# Patient Record
Sex: MALE | Race: WHITE | Employment: FULL TIME | URBAN - METROPOLITAN AREA
[De-identification: names, ages, dates, MRNs, and addresses within clinical notes are randomized per-mention and may not be internally consistent; named-entity substitution may affect disease eponyms.]

---

## 2019-07-12 ENCOUNTER — OFFICE VISIT (OUTPATIENT)
Dept: FAMILY MEDICINE CLINIC | Age: 54
End: 2019-07-12
Payer: COMMERCIAL

## 2019-07-12 VITALS
RESPIRATION RATE: 12 BRPM | WEIGHT: 188 LBS | OXYGEN SATURATION: 97 % | DIASTOLIC BLOOD PRESSURE: 66 MMHG | BODY MASS INDEX: 30.22 KG/M2 | SYSTOLIC BLOOD PRESSURE: 138 MMHG | HEART RATE: 60 BPM | HEIGHT: 66 IN | TEMPERATURE: 97 F

## 2019-07-12 DIAGNOSIS — Z12.5 SCREENING FOR PROSTATE CANCER: ICD-10-CM

## 2019-07-12 DIAGNOSIS — Z79.4 DIABETES MELLITUS TYPE 2, INSULIN DEPENDENT (HCC): ICD-10-CM

## 2019-07-12 DIAGNOSIS — Z00.00 ROUTINE GENERAL MEDICAL EXAMINATION AT A HEALTH CARE FACILITY: Primary | ICD-10-CM

## 2019-07-12 DIAGNOSIS — E11.9 DIABETES MELLITUS TYPE 2, INSULIN DEPENDENT (HCC): ICD-10-CM

## 2019-07-12 PROCEDURE — 99386 PREV VISIT NEW AGE 40-64: CPT | Performed by: FAMILY MEDICINE

## 2019-07-12 RX ORDER — ASPIRIN 325 MG
325 TABLET ORAL
COMMUNITY
Start: 2012-07-30

## 2019-07-12 RX ORDER — METOPROLOL SUCCINATE 25 MG/1
25 TABLET, EXTENDED RELEASE ORAL
COMMUNITY
Start: 2019-03-04 | End: 2020-01-23 | Stop reason: SDUPTHER

## 2019-07-12 RX ORDER — NIACIN 750 MG/1
750 TABLET, EXTENDED RELEASE ORAL
COMMUNITY
Start: 2015-02-05 | End: 2019-07-12

## 2019-07-12 RX ORDER — IBUPROFEN 800 MG/1
200 TABLET ORAL PRN
COMMUNITY
Start: 2015-02-12 | End: 2021-11-16 | Stop reason: SDUPTHER

## 2019-07-12 RX ORDER — ATORVASTATIN CALCIUM 80 MG/1
80 TABLET, FILM COATED ORAL
COMMUNITY
Start: 2015-02-05 | End: 2019-11-20 | Stop reason: SDUPTHER

## 2019-07-12 RX ORDER — ALBUTEROL SULFATE 90 UG/1
2 AEROSOL, METERED RESPIRATORY (INHALATION)
COMMUNITY
Start: 2018-10-19 | End: 2019-07-12

## 2019-07-12 RX ORDER — GLIMEPIRIDE 4 MG/1
4 TABLET ORAL NIGHTLY
Qty: 90 TABLET | Refills: 1 | Status: SHIPPED | OUTPATIENT
Start: 2019-07-12 | End: 2019-11-26 | Stop reason: SDUPTHER

## 2019-07-12 RX ORDER — NITROGLYCERIN 0.4 MG/1
0.4 TABLET SUBLINGUAL
COMMUNITY
Start: 2018-06-14 | End: 2020-01-23 | Stop reason: SDUPTHER

## 2019-07-12 RX ORDER — GLIMEPIRIDE 4 MG/1
4 TABLET ORAL NIGHTLY
Qty: 30 TABLET | Refills: 0 | Status: SHIPPED | OUTPATIENT
Start: 2019-07-12 | End: 2019-07-12 | Stop reason: SDUPTHER

## 2019-07-12 RX ORDER — RAMIPRIL 10 MG/1
10 CAPSULE ORAL
COMMUNITY
Start: 2019-01-09 | End: 2019-07-12 | Stop reason: SDUPTHER

## 2019-07-12 RX ORDER — GLIMEPIRIDE 4 MG/1
4 TABLET ORAL
COMMUNITY
Start: 2015-02-05 | End: 2019-07-12 | Stop reason: SDUPTHER

## 2019-07-12 RX ORDER — RAMIPRIL 10 MG/1
10 CAPSULE ORAL DAILY
Qty: 30 CAPSULE | Refills: 0 | Status: SHIPPED | OUTPATIENT
Start: 2019-07-12 | End: 2019-07-12 | Stop reason: SDUPTHER

## 2019-07-12 RX ORDER — LANCETS
EACH MISCELLANEOUS
COMMUNITY
Start: 2019-01-09

## 2019-07-12 RX ORDER — FLASH GLUCOSE SCANNING READER
1 EACH MISCELLANEOUS 4 TIMES DAILY
Qty: 1 DEVICE | Refills: 0 | Status: SHIPPED | OUTPATIENT
Start: 2019-07-12 | End: 2020-05-18

## 2019-07-12 RX ORDER — ATENOLOL 50 MG/1
50 TABLET ORAL
COMMUNITY
Start: 2015-02-05 | End: 2019-07-12

## 2019-07-12 RX ORDER — CLOTRIMAZOLE AND BETAMETHASONE DIPROPIONATE 10; .5 MG/ML; MG/ML
LOTION TOPICAL
COMMUNITY
Start: 2013-06-06 | End: 2019-07-12 | Stop reason: ALTCHOICE

## 2019-07-12 RX ORDER — FLASH GLUCOSE SENSOR
1 KIT MISCELLANEOUS
Qty: 6 EACH | Refills: 2 | Status: SHIPPED | OUTPATIENT
Start: 2019-07-12 | End: 2020-05-18

## 2019-07-12 RX ORDER — RAMIPRIL 10 MG/1
10 CAPSULE ORAL DAILY
Qty: 90 CAPSULE | Refills: 1 | Status: SHIPPED | OUTPATIENT
Start: 2019-07-12 | End: 2019-11-26 | Stop reason: SDUPTHER

## 2019-07-12 ASSESSMENT — PATIENT HEALTH QUESTIONNAIRE - PHQ9
SUM OF ALL RESPONSES TO PHQ9 QUESTIONS 1 & 2: 0
SUM OF ALL RESPONSES TO PHQ QUESTIONS 1-9: 0
2. FEELING DOWN, DEPRESSED OR HOPELESS: 0
1. LITTLE INTEREST OR PLEASURE IN DOING THINGS: 0
SUM OF ALL RESPONSES TO PHQ QUESTIONS 1-9: 0

## 2019-07-12 NOTE — PATIENT INSTRUCTIONS
1) Keep up fitness! .    2) Medications are renewed and are at [address] and [facility]. Try out the [facility] if covered. 3) Keep up partnership with [facility] and Cardiologist. Let your eye doctor know who your PCP is. 4) Get fasting labwork done in July or August.  --You can get your lab work, x-ray, MRI, or ultrasound at our nearest [facility] location across the parking lot at   [employer]  Lab hours ([address] Monday through Friday; 8AM - noon on Saturdays),   Ph# [phone]  Radiology hours (7:00AM - 5PM Monday through Friday only)  --Call our office in 1 week if you have not heard about the results. Or check [employer] if you have previously enrolled.

## 2019-07-15 ENCOUNTER — TELEPHONE (OUTPATIENT)
Dept: FAMILY MEDICINE CLINIC | Age: 54
End: 2019-07-15

## 2019-07-15 NOTE — TELEPHONE ENCOUNTER
Spoke to patient he already had gone to his old physicians office and had filled out a record release from them on 7-12-19. I advised that he should contact them to see what the turnaround time is on sending out their records. He will check with their office later in the week to see if we have received the records as of yet.

## 2019-08-15 DIAGNOSIS — Z79.4 DIABETES MELLITUS TYPE 2, INSULIN DEPENDENT (HCC): Primary | ICD-10-CM

## 2019-08-15 DIAGNOSIS — E11.9 DIABETES MELLITUS TYPE 2, INSULIN DEPENDENT (HCC): Primary | ICD-10-CM

## 2019-08-15 NOTE — TELEPHONE ENCOUNTER
Mr Lan Saab called staling he needs a refill on the following medications:   metFORMIN (GLUCOPHAGE) 1000 MG tablet     sAXagliptin-metFORMIN ER (KOMBIGLYZE XR) 2.5-1000 MG TB24     Please send a 30 day supply to:  [address], [address] - F [phone]      And a 90 day supply of the same meds to:   [facility] [address] [facility]      Note: PLEASE SEND 90 DAY SUPPLY OF   insulin glargine (TOUJEO SOLOSTAR) 300 UNIT/ML injection pen to [address], [address] [facility] - F [phone]  because this medication is not covered by his insurance and patient will be using a coupon discount card.       PATIENT IS OUT OF BOTH PILLS PLEASE SEND TODAY :)    If any question pleae patient at [phone]

## 2019-11-20 RX ORDER — ATORVASTATIN CALCIUM 80 MG/1
80 TABLET, FILM COATED ORAL DAILY
Qty: 90 TABLET | Refills: 1 | Status: SHIPPED | OUTPATIENT
Start: 2019-11-20 | End: 2020-01-23 | Stop reason: SDUPTHER

## 2019-11-26 DIAGNOSIS — Z79.4 DIABETES MELLITUS TYPE 2, INSULIN DEPENDENT (HCC): ICD-10-CM

## 2019-11-26 DIAGNOSIS — E11.9 DIABETES MELLITUS TYPE 2, INSULIN DEPENDENT (HCC): ICD-10-CM

## 2019-11-27 RX ORDER — INSULIN LISPRO 100 [IU]/ML
INJECTION, SOLUTION INTRAVENOUS; SUBCUTANEOUS
Qty: 45 ML | Refills: 0 | Status: SHIPPED | OUTPATIENT
Start: 2019-11-27 | End: 2020-01-22

## 2019-11-27 RX ORDER — GLIMEPIRIDE 4 MG/1
4 TABLET ORAL NIGHTLY
Qty: 90 TABLET | Refills: 0 | Status: SHIPPED | OUTPATIENT
Start: 2019-11-27 | End: 2020-01-23 | Stop reason: SDUPTHER

## 2019-11-27 RX ORDER — RAMIPRIL 10 MG/1
10 CAPSULE ORAL DAILY
Qty: 90 CAPSULE | Refills: 0 | Status: SHIPPED | OUTPATIENT
Start: 2019-11-27 | End: 2020-01-23 | Stop reason: SDUPTHER

## 2019-12-04 DIAGNOSIS — Z79.4 DIABETES MELLITUS TYPE 2, INSULIN DEPENDENT (HCC): Primary | ICD-10-CM

## 2019-12-04 DIAGNOSIS — E11.9 DIABETES MELLITUS TYPE 2, INSULIN DEPENDENT (HCC): Primary | ICD-10-CM

## 2019-12-05 ENCOUNTER — TELEPHONE (OUTPATIENT)
Dept: ORTHOPEDIC SURGERY | Age: 54
End: 2019-12-05

## 2019-12-05 ENCOUNTER — TELEPHONE (OUTPATIENT)
Dept: FAMILY MEDICINE CLINIC | Age: 54
End: 2019-12-05

## 2019-12-05 DIAGNOSIS — Z79.4 DIABETES MELLITUS TYPE 2, INSULIN DEPENDENT (HCC): Primary | ICD-10-CM

## 2019-12-05 DIAGNOSIS — E11.9 DIABETES MELLITUS TYPE 2, INSULIN DEPENDENT (HCC): Primary | ICD-10-CM

## 2020-01-23 ENCOUNTER — OFFICE VISIT (OUTPATIENT)
Dept: FAMILY MEDICINE CLINIC | Age: 55
End: 2020-01-23
Payer: COMMERCIAL

## 2020-01-23 VITALS
WEIGHT: 190.4 LBS | OXYGEN SATURATION: 97 % | HEART RATE: 65 BPM | TEMPERATURE: 97.4 F | RESPIRATION RATE: 12 BRPM | BODY MASS INDEX: 30.73 KG/M2 | SYSTOLIC BLOOD PRESSURE: 180 MMHG | DIASTOLIC BLOOD PRESSURE: 82 MMHG

## 2020-01-23 PROCEDURE — G8417 CALC BMI ABV UP PARAM F/U: HCPCS | Performed by: FAMILY MEDICINE

## 2020-01-23 PROCEDURE — 99214 OFFICE O/P EST MOD 30 MIN: CPT | Performed by: FAMILY MEDICINE

## 2020-01-23 PROCEDURE — 2022F DILAT RTA XM EVC RTNOPTHY: CPT | Performed by: FAMILY MEDICINE

## 2020-01-23 PROCEDURE — G8484 FLU IMMUNIZE NO ADMIN: HCPCS | Performed by: FAMILY MEDICINE

## 2020-01-23 PROCEDURE — 3046F HEMOGLOBIN A1C LEVEL >9.0%: CPT | Performed by: FAMILY MEDICINE

## 2020-01-23 PROCEDURE — G8427 DOCREV CUR MEDS BY ELIG CLIN: HCPCS | Performed by: FAMILY MEDICINE

## 2020-01-23 PROCEDURE — 3017F COLORECTAL CA SCREEN DOC REV: CPT | Performed by: FAMILY MEDICINE

## 2020-01-23 PROCEDURE — 1036F TOBACCO NON-USER: CPT | Performed by: FAMILY MEDICINE

## 2020-01-23 RX ORDER — BLOOD-GLUCOSE METER
1 KIT MISCELLANEOUS
Qty: 1 KIT | Refills: 0 | Status: SHIPPED | OUTPATIENT
Start: 2020-01-23

## 2020-01-23 RX ORDER — METOPROLOL SUCCINATE 25 MG/1
25 TABLET, EXTENDED RELEASE ORAL DAILY
Qty: 90 TABLET | Refills: 3 | Status: SHIPPED | OUTPATIENT
Start: 2020-01-23 | End: 2020-08-18

## 2020-01-23 RX ORDER — INSULIN LISPRO 100 [IU]/ML
INJECTION, SOLUTION INTRAVENOUS; SUBCUTANEOUS
Qty: 45 ML | Refills: 1 | Status: SHIPPED | OUTPATIENT
Start: 2020-01-23 | End: 2020-01-23 | Stop reason: SDUPTHER

## 2020-01-23 RX ORDER — GLUCOSAMINE HCL/CHONDROITIN SU 500-400 MG
CAPSULE ORAL
Qty: 100 STRIP | Refills: 0 | Status: SHIPPED | OUTPATIENT
Start: 2020-01-23

## 2020-01-23 RX ORDER — RAMIPRIL 10 MG/1
10 CAPSULE ORAL DAILY
Qty: 90 CAPSULE | Refills: 3 | Status: SHIPPED | OUTPATIENT
Start: 2020-01-23 | End: 2021-02-18 | Stop reason: SDUPTHER

## 2020-01-23 RX ORDER — AZITHROMYCIN 250 MG/1
250 TABLET, FILM COATED ORAL SEE ADMIN INSTRUCTIONS
Qty: 6 TABLET | Refills: 0 | Status: SHIPPED | OUTPATIENT
Start: 2020-01-23 | End: 2020-01-28

## 2020-01-23 RX ORDER — INSULIN LISPRO 100 [IU]/ML
INJECTION, SOLUTION INTRAVENOUS; SUBCUTANEOUS
Qty: 45 ML | Refills: 1 | Status: SHIPPED | OUTPATIENT
Start: 2020-01-23 | End: 2020-08-17 | Stop reason: SDUPTHER

## 2020-01-23 RX ORDER — ATORVASTATIN CALCIUM 80 MG/1
80 TABLET, FILM COATED ORAL DAILY
Qty: 90 TABLET | Refills: 1 | Status: SHIPPED | OUTPATIENT
Start: 2020-01-23 | End: 2020-07-22 | Stop reason: SDUPTHER

## 2020-01-23 RX ORDER — NITROGLYCERIN 0.4 MG/1
0.4 TABLET SUBLINGUAL EVERY 5 MIN PRN
Qty: 25 TABLET | Refills: 0 | Status: SHIPPED | OUTPATIENT
Start: 2020-01-23 | End: 2020-08-17 | Stop reason: SDUPTHER

## 2020-01-23 RX ORDER — GLIMEPIRIDE 4 MG/1
4 TABLET ORAL NIGHTLY
Qty: 90 TABLET | Refills: 3 | Status: SHIPPED | OUTPATIENT
Start: 2020-01-23 | End: 2021-02-18 | Stop reason: SDUPTHER

## 2020-01-23 NOTE — PATIENT INSTRUCTIONS
1) Keep up f/u plans with [facility]. Ask them what they think about the letter. 2) Sorry, no flu shots here. Check local retail pharmacies about flu shot. 3) Let your PCP know if the gastric emptying test is too expensive. 4) Start abx and notify your PCP if no significant improvement in 5 to 7 days. 5) Recheck labwork in 3 months. Recommendations for Adults   Get at least 150 minutes per week of moderate-intensity aerobic activity or 75 minutes per week of vigorous aerobic activity, or a combination of both, preferably spread throughout the week.  Add moderate- to high-intensity muscle-strengthening activity (such as resistance or weights) on at least 2 days per week.  Spend less time sitting. Even light-intensity activity can offset some of the risks of being sedentary.  Gain even more benefits by being active at least 300 minutes (5 hours) per week.  Increase amount and intensity gradually over time. Recommendations for Kids   Children 15 years old should be physically active and have plenty of opportunities to move throughout the day.  Kids 6-12 years old should get at least 60 minutes per day of moderate- to vigorous-intensity physical activity, mostly aerobic.  Include vigorous-intensity activity on at least 3 days per week.  Include muscle- and bone-strengthening (weight-bearing) activities on at least 3 days per week.  Increase amount and intensity gradually over time. What is intensity? Physical activity is anything that moves your body and burns calories. This includes things like walking, climbing stairs and stretching. Aerobic (or cardio) activity gets your heart rate up and benefits your heart by improving cardiorespiratory fitness. When done at moderate intensity, your heart will beat faster and youll breathe harder than normal, but youll still be able to talk. Think of it as a medium or moderate amount of effort.   Examples of moderate-intensity aerobic activities:   brisk walking (at least 2.5 miles per hour)    water aerobics    dancing (ballroom or social)    gardening    tennis (doubles)    biking slower than 10 miles per hour  Vigorous intensity activities will push your body a little further. They will require a higher amount of effort. Jas[place] probably get warm and begin to sweat. You wont be able to talk much without getting out of breath.   Examples of vigorous-intensity aerobic activities:   hiking uphill or with a heavy backpack    running    swimming laps    aerobic dancing    heavy yardwork like continuous digging or hoeing    tennis (singles)    cycling 10 miles per hour or faster    jumping rope

## 2020-01-23 NOTE — PROGRESS NOTES
Santi Dias MD   metFORMIN (GLUCOPHAGE) 1000 MG tablet Take 1 tablet by mouth Daily with supper  Patient not taking: Reported on 1/23/2020  Isaac Castaneda DO          Vitals:    01/23/20 0806 01/23/20 0838   BP: (!) 181/91 (!) 180/82   Pulse: 65    Resp: 12    Temp: 97.4 °F (36.3 °C)    TempSrc: Oral    SpO2: 97%    Weight: 190 lb 6.4 oz (86.4 kg)       Wt Readings from Last 3 Encounters:   01/23/20 190 lb 6.4 oz (86.4 kg)   07/12/19 188 lb (85.3 kg)     BP Readings from Last 3 Encounters:   01/23/20 (!) 180/82   07/12/19 138/66       There is no problem list on file for this patient. Immunization History   Administered Date(s) Administered    Influenza Vaccine, unspecified formulation 01/31/2013    Influenza Virus Vaccine 12/15/2008, 10/24/2011, 10/24/2013    Influenza, MDCK Quadv, with preserv IM (Flucelvax 4 yrs and older) 01/23/2019    Influenza, Jhony Hawks, IM, PF (6 mo and older Fluzone, Flulaval, Fluarix, and 3 yrs and older Afluria) 11/19/2015, 11/16/2016, 11/20/2017       No past medical history on file.   Past Surgical History:   Procedure Laterality Date    CORONARY ARTERY BYPASS GRAFT REDO  2016     Family History   Problem Relation Age of Onset    Heart Disease Mother     Heart Disease Father     Diabetes Father      Social History     Socioeconomic History    Marital status:      Spouse name: Not on file    Number of children: Not on file    Years of education: Not on file    Highest education level: Not on file   Occupational History    Not on file   Social Needs    Financial resource strain: Not on file    Food insecurity:     Worry: Not on file     Inability: Not on file    Transportation needs:     Medical: Not on file     Non-medical: Not on file   Tobacco Use    Smoking status: Never Smoker    Smokeless tobacco: Never Used   Substance and Sexual Activity    Alcohol use: Not Currently    Drug use: Never    Sexual activity: Not on file   Lifestyle    Physical activity:     Days per week: Not on file     Minutes per session: Not on file    Stress: Not on file   Relationships    Social connections:     Talks on phone: Not on file     Gets together: Not on file     Attends Advent service: Not on file     Active member of club or organization: Not on file     Attends meetings of clubs or organizations: Not on file     Relationship status: Not on file    Intimate partner violence:     Fear of current or ex partner: Not on file     Emotionally abused: Not on file     Physically abused: Not on file     Forced sexual activity: Not on file   Other Topics Concern    Not on file   Social History Narrative    Not on file       O: BP (!) 180/82   Pulse 65   Temp 97.4 °F (36.3 °C) (Oral)   Resp 12   Wt 190 lb 6.4 oz (86.4 kg)   SpO2 97%   BMI 30.73 kg/m²   Physical Exam  GEN: No acute distress, cooperative, well nourished, alert. HEENT: PEERLA, EOMI , normocephalic/atraumatic, nares and oropharynx clear. Mucus membranes normal, Tympanic membranes clear bilaterally. Neck: soft, supple, no thyromegaly, mass, no Lymphadenopathy  CV: Regular rate and rhythm, no murmur, rubs, gallops. No edema. Resp: Clear to auscultation bilaterally good air entry bilaterally  No crackles, wheeze. Breathing comfortably. Psych: normal affect. Neuro: AOx3  Abdomen exam tenderness to palpation in all quadrants but negative Cooper sign    Lab Results   Component Value Date    LABA1C 9.2 (H) 01/15/2020     Lab Results   Component Value Date     01/15/2020         ASSESSMENT   Diagnosis Orders   1. Diabetes mellitus type 2, insulin dependent (HCC)  insulin lispro, 1 Unit Dial, (HUMALOG KWIKPEN) 100 UNIT/ML SOPN    sAXagliptin-metFORMIN ER (KOMBIGLYZE XR) 2.5-1000 MG TB24    NM GASTRIC EMPTYING    HEMOGLOBIN A1C    COMPREHENSIVE METABOLIC PANEL   2. Bronchitis  azithromycin (ZITHROMAX) 250 MG tablet   3. Constipation, unspecified constipation type  NM GASTRIC EMPTYING   4. Abdominal bloating  NM GASTRIC EMPTYING     Needs better A1c control #1. Change Toujeo to [place] due to formulary change. Symptoms for #3 and 4 sound highly suggestive of gastroparesis. See plan below. #2: The risks, benefits, potential side effects and barriers to medication use were addressed today. Understanding was acknowledged. Patient asked to follow-up if condition(s) do not improve as anticipated. PLAN          If applicable, see additional patient information and instructions under \"Patient Instructions. \"    Return in about 3 months (around 4/23/2020). Patient Instructions   1) Keep up f/u plans with [facility]. Ask them what they think about the letter. 2) Sorry, no flu shots here. Check local retail pharmacies about flu shot. 3) Let your PCP know if the gastric emptying test is too expensive. 4) Start abx and notify your PCP if no significant improvement in 5 to 7 days. 5) Recheck labwork in 3 months. Recommendations for Adults   Get at least 150 minutes per week of moderate-intensity aerobic activity or 75 minutes per week of vigorous aerobic activity, or a combination of both, preferably spread throughout the week.  Add moderate- to high-intensity muscle-strengthening activity (such as resistance or weights) on at least 2 days per week.  Spend less time sitting. Even light-intensity activity can offset some of the risks of being sedentary.  Gain even more benefits by being active at least 300 minutes (5 hours) per week.  Increase amount and intensity gradually over time. Recommendations for Kids   Children 15 years old should be physically active and have plenty of opportunities to move throughout the day.  Kids 6-12 years old should get at least 60 minutes per day of moderate- to vigorous-intensity physical activity, mostly aerobic.  Include vigorous-intensity activity on at least 3 days per week.      Include muscle- and bone-strengthening (weight-bearing) activities on at least 3 days per week.  Increase amount and intensity gradually over time. What is intensity? Physical activity is anything that moves your body and burns calories. This includes things like walking, climbing stairs and stretching. Aerobic (or cardio) activity gets your heart rate up and benefits your heart by improving cardiorespiratory fitness. When done at moderate intensity, your heart will beat faster and youll breathe harder than normal, but youll still be able to talk. Think of it as a medium or moderate amount of effort. Examples of moderate-intensity aerobic activities:   brisk walking (at least 2.5 miles per hour)    water aerobics    dancing (ballroom or social)    gardening    tennis (doubles)    biking slower than 10 miles per hour  Vigorous intensity activities will push your body a little further. They will require a higher amount of effort. Fab Aloe probably get warm and begin to sweat. You wont be able to talk much without getting out of breath. Examples of vigorous-intensity aerobic activities:   hiking uphill or with a heavy backpack    running    swimming laps    aerobic dancing    heavy yardwork like continuous digging or hoeing    tennis (singles)    cycling 10 miles per hour or faster    jumping rope          Please note a portion of this chart was generated using dragon dictation software. Although every effort was made to ensure the accuracy of this automated transcription, some errors in transcription may have occurred.

## 2020-01-30 ENCOUNTER — TELEPHONE (OUTPATIENT)
Dept: FAMILY MEDICINE CLINIC | Age: 55
End: 2020-01-30

## 2020-01-30 DIAGNOSIS — J40 BRONCHITIS: Primary | ICD-10-CM

## 2020-01-30 RX ORDER — AMOXICILLIN 875 MG/1
875 TABLET, COATED ORAL 2 TIMES DAILY
Qty: 14 TABLET | Refills: 0 | Status: SHIPPED | OUTPATIENT
Start: 2020-01-30 | End: 2020-02-06

## 2020-01-30 RX ORDER — CODEINE PHOSPHATE/GUAIFENESIN 10-100MG/5
5 LIQUID (ML) ORAL 2 TIMES DAILY PRN
Qty: 50 ML | Refills: 0 | Status: SHIPPED | OUTPATIENT
Start: 2020-01-30 | End: 2020-02-04

## 2020-03-04 ENCOUNTER — TELEPHONE (OUTPATIENT)
Dept: ORTHOPEDIC SURGERY | Age: 55
End: 2020-03-04

## 2020-03-06 ENCOUNTER — TELEPHONE (OUTPATIENT)
Dept: FAMILY MEDICINE CLINIC | Age: 55
End: 2020-03-06

## 2020-03-06 DIAGNOSIS — Z79.4 DIABETES MELLITUS TYPE 2, INSULIN DEPENDENT (HCC): Primary | ICD-10-CM

## 2020-03-06 DIAGNOSIS — E11.9 DIABETES MELLITUS TYPE 2, INSULIN DEPENDENT (HCC): Primary | ICD-10-CM

## 2020-05-13 RX ORDER — INSULIN DEGLUDEC 200 U/ML
80 INJECTION, SOLUTION SUBCUTANEOUS EVERY EVENING
Qty: 3 PEN | Refills: 3 | Status: SHIPPED | OUTPATIENT
Start: 2020-05-13 | End: 2020-05-18 | Stop reason: SDUPTHER

## 2020-05-13 RX ORDER — INSULIN DEGLUDEC 200 U/ML
80 INJECTION, SOLUTION SUBCUTANEOUS NIGHTLY
Qty: 1 PEN | Refills: 0 | Status: SHIPPED
Start: 2020-05-13 | End: 2020-08-19

## 2020-05-13 RX ORDER — INSULIN DEGLUDEC 200 U/ML
80 INJECTION, SOLUTION SUBCUTANEOUS NIGHTLY
Qty: 1 PEN | Refills: 0 | Status: SHIPPED | OUTPATIENT
Start: 2020-05-13 | End: 2020-05-13 | Stop reason: SDUPTHER

## 2020-05-13 NOTE — TELEPHONE ENCOUNTER
Done.    Could office staff confirm 1 pen of Tresiba went to [address] and 3 month supply when to [facility]? Then let patient know.

## 2020-05-15 ENCOUNTER — TELEPHONE (OUTPATIENT)
Dept: FAMILY MEDICINE CLINIC | Age: 55
End: 2020-05-15

## 2020-05-15 NOTE — TELEPHONE ENCOUNTER
[address] [facility] called needing to clarify directions for the quantity. Patient said she suppose to receive a box. Pharmacy states that it says one pen. Please advise.  Thanks    Insulin Degludec (TRESIBA FLEXTOUCH) 200 UNIT/ML SOPN [[id number]]     Order Details   Dose: 80 Units Route: Subcutaneous Frequency: EVERY EVENING  Dispense Quantity: 3 pen Refills: 3 Fills remaining: --         Sig: Inject 80 Units into the skin every evening        Written Date: 05/13/20 Expiration Date: 05/13/21    Start Date: 05/13/20 End Date: --    Ordering Provider:  -- Authorizing Provider: Fercho Mcfarland DO Ordering User:  Fercho Mcfarland DO          Original Order:  Insulin Degludec ([address]) 200 UNIT/ML Willem Pierre [[id number]]    Pharmacy:  [facility] [facility] [phone] Kaylin Cohn [phone]     Pharmacy Comments: --

## 2020-05-18 ENCOUNTER — TELEPHONE (OUTPATIENT)
Dept: FAMILY MEDICINE CLINIC | Age: 55
End: 2020-05-18

## 2020-05-18 ENCOUNTER — VIRTUAL VISIT (OUTPATIENT)
Dept: FAMILY MEDICINE CLINIC | Age: 55
End: 2020-05-18
Payer: COMMERCIAL

## 2020-05-18 VITALS — BODY MASS INDEX: 29.05 KG/M2 | WEIGHT: 180 LBS

## 2020-05-18 PROCEDURE — 3017F COLORECTAL CA SCREEN DOC REV: CPT | Performed by: FAMILY MEDICINE

## 2020-05-18 PROCEDURE — G8417 CALC BMI ABV UP PARAM F/U: HCPCS | Performed by: FAMILY MEDICINE

## 2020-05-18 PROCEDURE — 3046F HEMOGLOBIN A1C LEVEL >9.0%: CPT | Performed by: FAMILY MEDICINE

## 2020-05-18 PROCEDURE — 99214 OFFICE O/P EST MOD 30 MIN: CPT | Performed by: FAMILY MEDICINE

## 2020-05-18 PROCEDURE — G8427 DOCREV CUR MEDS BY ELIG CLIN: HCPCS | Performed by: FAMILY MEDICINE

## 2020-05-18 PROCEDURE — 1036F TOBACCO NON-USER: CPT | Performed by: FAMILY MEDICINE

## 2020-05-18 PROCEDURE — 2022F DILAT RTA XM EVC RTNOPTHY: CPT | Performed by: FAMILY MEDICINE

## 2020-05-18 RX ORDER — INSULIN DEGLUDEC 200 U/ML
80 INJECTION, SOLUTION SUBCUTANEOUS EVERY EVENING
Qty: 2 PEN | Refills: 0 | Status: SHIPPED
Start: 2020-05-18 | End: 2020-08-19

## 2020-05-18 ASSESSMENT — PATIENT HEALTH QUESTIONNAIRE - PHQ9
SUM OF ALL RESPONSES TO PHQ QUESTIONS 1-9: 0
2. FEELING DOWN, DEPRESSED OR HOPELESS: 0
SUM OF ALL RESPONSES TO PHQ QUESTIONS 1-9: 0
1. LITTLE INTEREST OR PLEASURE IN DOING THINGS: 0
SUM OF ALL RESPONSES TO PHQ9 QUESTIONS 1 & 2: 0

## 2020-05-18 NOTE — TELEPHONE ENCOUNTER
See my tel enc 5/18 that Laie will dispense the remaining 2 pens. Then let Wilfrid Marcelino know. Verify that he still wants future Tresiba prescriptions though the [facility] mail order.

## 2020-05-18 NOTE — PROGRESS NOTES
from Last 3 Encounters:   01/23/20 (!) 180/82   07/12/19 138/66       There is no problem list on file for this patient. Immunization History   Administered Date(s) Administered    Influenza Vaccine, unspecified formulation 01/31/2013    Influenza Virus Vaccine 12/15/2008, 10/24/2011, 10/24/2013, 11/19/2015, 11/16/2016, 11/20/2017    Influenza, MDCK Quadv, with preserv IM (Flucelvax 4 yrs and older) 01/23/2019    Influenza, Venita Flies, IM, PF (6 mo and older Fluzone, Flulaval, Fluarix, and 3 yrs and older Afluria) 11/19/2015, 11/16/2016, 11/20/2017       No past medical history on file.   Past Surgical History:   Procedure Laterality Date    CORONARY ARTERY BYPASS GRAFT REDO  2016     Family History   Problem Relation Age of Onset    Heart Disease Mother     Heart Disease Father     Diabetes Father      Social History     Socioeconomic History    Marital status:      Spouse name: Not on file    Number of children: Not on file    Years of education: Not on file    Highest education level: Not on file   Occupational History    Not on file   Social Needs    Financial resource strain: Not on file    Food insecurity     Worry: Not on file     Inability: Not on file    Transportation needs     Medical: Not on file     Non-medical: Not on file   Tobacco Use    Smoking status: Never Smoker    Smokeless tobacco: Never Used   Substance and Sexual Activity    Alcohol use: Not Currently    Drug use: Never    Sexual activity: Not on file   Lifestyle    Physical activity     Days per week: Not on file     Minutes per session: Not on file    Stress: Not on file   Relationships    Social connections     Talks on phone: Not on file     Gets together: Not on file     Attends Religion service: Not on file     Active member of club or organization: Not on file     Attends meetings of clubs or organizations: Not on file     Relationship status: Not on file    Intimate partner violence     Fear of current type 2, insulin dependent (HCC)  Insulin Degludec (TRESIBA FLEXTOUCH) 200 UNIT/ML SOPN    HEMOGLOBIN A1C    COMPREHENSIVE METABOLIC PANEL   2. Need for hepatitis C screening test  HEPATITIS C ANTIBODY     Office will see what glucometer is in formulary. labwork this summer. F/u Aug 12. He will look into cologuard option. PLAN          If applicable, see additional patient information and instructions under \"Patient Instructions. \"    Return in about 3 months (around 2020). Patient Instructions   Keep up with a yearly eye exam and see the [facility] clinic in . Contact Cologuard. If free or inexepensive, call your PCP's office and he will order the test. If it is not covered. Notify Tonya and we can issue the yearly stool card test or refer you for colonoscopy. TOTAL TIME (in minutes) SPENT ON CONFERENCIN.    Please note a portion of this chart was generated using dragon dictation software. Although every effort was made to ensure the accuracy of this automated transcription, some errors in transcription may have occurred. Pursuant to the emergency declaration under the [facility], [facility]5 waiver authority and the [employer] Act, this Virtual  Visit was conducted, with patient's consent, to reduce the patient's risk of exposure to COVID-19 and provide continuity of care for an established patient. Services were provided through a video synchronous discussion virtually to substitute for in-person clinic visit. Patient was instructed that the AVS is available on My Chart or was emailed to the patient if not on My Chart. Lab orders were emailed to patient if they do not use a [facility] lab. Any work notes were sent to patient through My Chart or email.

## 2020-05-21 RX ORDER — BLOOD-GLUCOSE METER
1 KIT MISCELLANEOUS
Qty: 1 KIT | Refills: 0 | Status: SHIPPED
Start: 2020-05-21 | End: 2020-05-26

## 2020-05-21 RX ORDER — L. ACIDOPHILUS/BIFIDO. LONGUM 15 MG
1 CAPSULE,DELAYED RELEASE (ENTERIC COATED) ORAL
Qty: 100 EACH | Refills: 3 | Status: SHIPPED
Start: 2020-05-21 | End: 2020-05-26

## 2020-05-22 ENCOUNTER — TELEPHONE (OUTPATIENT)
Dept: FAMILY MEDICINE CLINIC | Age: 55
End: 2020-05-22

## 2020-05-26 RX ORDER — BLOOD-GLUCOSE METER
1 KIT MISCELLANEOUS
Qty: 1 KIT | Refills: 0 | Status: SHIPPED | OUTPATIENT
Start: 2020-05-26 | End: 2021-02-18 | Stop reason: ALTCHOICE

## 2020-05-26 RX ORDER — LANCETS
1 EACH MISCELLANEOUS
Qty: 100 EACH | Refills: 3 | Status: SHIPPED | OUTPATIENT
Start: 2020-05-26 | End: 2020-08-18 | Stop reason: SDUPTHER

## 2020-05-26 NOTE — TELEPHONE ENCOUNTER
Received DENIAL for IEV Glucose Test strips. Denial letter attached. Please advise patient. Thank you.

## 2020-05-26 NOTE — TELEPHONE ENCOUNTER
Is on insulin. Needs a meter on insurance plan. Call pharmacy that the [facility] mini meter along with test strips and lancets is on his formulary. THen update Rahel

## 2020-06-19 ENCOUNTER — TELEPHONE (OUTPATIENT)
Dept: FAMILY MEDICINE CLINIC | Age: 55
End: 2020-06-19

## 2020-06-29 ENCOUNTER — TELEPHONE (OUTPATIENT)
Dept: FAMILY MEDICINE CLINIC | Age: 55
End: 2020-06-29

## 2020-06-29 RX ORDER — AMLODIPINE BESYLATE 10 MG/1
10 TABLET ORAL DAILY
Qty: 30 TABLET | Refills: 0 | Status: SHIPPED | OUTPATIENT
Start: 2020-06-29 | End: 2020-07-22 | Stop reason: SDUPTHER

## 2020-06-29 NOTE — TELEPHONE ENCOUNTER
[facility] received a call from:    Name of Caller: Neymar    Relationship to patient:self    Organization name: n/a    Best contact number: [phone]    Reason for call: Patient called in and is requesting a rx sent over to pharmacy for high bp, patient said number was at   [phone]. Please advise. Pharmacy verified. Dressing: steri-strips

## 2020-07-20 ENCOUNTER — TELEPHONE (OUTPATIENT)
Dept: FAMILY MEDICINE CLINIC | Age: 55
End: 2020-07-20

## 2020-07-21 NOTE — TELEPHONE ENCOUNTER
Continue amlodipine 10 mg. Increase Metoprolol XL 25 mg from one tablet a day to two tablets a day. Call with BP update in 48 hours later.

## 2020-07-22 RX ORDER — AMLODIPINE BESYLATE 10 MG/1
10 TABLET ORAL DAILY
Qty: 90 TABLET | Refills: 1 | Status: SHIPPED | OUTPATIENT
Start: 2020-07-22 | End: 2020-12-01

## 2020-07-22 RX ORDER — ATORVASTATIN CALCIUM 80 MG/1
80 TABLET, FILM COATED ORAL DAILY
Qty: 90 TABLET | Refills: 1 | Status: SHIPPED | OUTPATIENT
Start: 2020-07-22 | End: 2020-12-01

## 2020-08-12 ENCOUNTER — TELEPHONE (OUTPATIENT)
Dept: FAMILY MEDICINE CLINIC | Age: 55
End: 2020-08-12

## 2020-08-13 NOTE — TELEPHONE ENCOUNTER
Try again please. Apologize for the last minute cancellation (no one rescheduled this; I had a doctor's meeting planned 7am-8 am).

## 2020-08-17 ENCOUNTER — VIRTUAL VISIT (OUTPATIENT)
Dept: FAMILY MEDICINE CLINIC | Age: 55
End: 2020-08-17
Payer: COMMERCIAL

## 2020-08-17 PROCEDURE — 3052F HG A1C>EQUAL 8.0%<EQUAL 9.0%: CPT | Performed by: FAMILY MEDICINE

## 2020-08-17 PROCEDURE — 99213 OFFICE O/P EST LOW 20 MIN: CPT | Performed by: FAMILY MEDICINE

## 2020-08-17 PROCEDURE — 3017F COLORECTAL CA SCREEN DOC REV: CPT | Performed by: FAMILY MEDICINE

## 2020-08-17 PROCEDURE — 2022F DILAT RTA XM EVC RTNOPTHY: CPT | Performed by: FAMILY MEDICINE

## 2020-08-17 PROCEDURE — G8427 DOCREV CUR MEDS BY ELIG CLIN: HCPCS | Performed by: FAMILY MEDICINE

## 2020-08-18 RX ORDER — INSULIN LISPRO 100 [IU]/ML
INJECTION, SOLUTION INTRAVENOUS; SUBCUTANEOUS
Qty: 45 ML | Refills: 3 | Status: SHIPPED | OUTPATIENT
Start: 2020-08-18 | End: 2021-02-18 | Stop reason: SDUPTHER

## 2020-08-18 RX ORDER — METOPROLOL TARTRATE 50 MG/1
50 TABLET, FILM COATED ORAL 2 TIMES DAILY
Qty: 60 TABLET | Refills: 5 | Status: SHIPPED | OUTPATIENT
Start: 2020-08-18 | End: 2020-11-13 | Stop reason: SDUPTHER

## 2020-08-18 RX ORDER — NITROGLYCERIN 0.4 MG/1
0.4 TABLET SUBLINGUAL EVERY 5 MIN PRN
Qty: 25 TABLET | Refills: 0 | Status: SHIPPED | OUTPATIENT
Start: 2020-08-18 | End: 2021-02-18 | Stop reason: SDUPTHER

## 2020-08-18 RX ORDER — PEN NEEDLE, DIABETIC 31 GX5/16"
NEEDLE, DISPOSABLE MISCELLANEOUS
Qty: 100 EACH | Refills: 3 | Status: SHIPPED | OUTPATIENT
Start: 2020-08-18 | End: 2020-08-21 | Stop reason: SDUPTHER

## 2020-08-18 RX ORDER — GABAPENTIN 300 MG/1
300 CAPSULE ORAL NIGHTLY
Qty: 30 CAPSULE | Refills: 5 | Status: SHIPPED | OUTPATIENT
Start: 2020-08-18 | End: 2021-02-18 | Stop reason: SDUPTHER

## 2020-08-18 RX ORDER — LANCETS
1 EACH MISCELLANEOUS
Qty: 100 EACH | Refills: 3 | Status: SHIPPED | OUTPATIENT
Start: 2020-08-18 | End: 2021-02-18 | Stop reason: ALTCHOICE

## 2020-08-18 NOTE — PROGRESS NOTES
DO Tonya   sAXagliptin-metFORMIN ER (KOMBIGLYZE XR) 2.5-1000 MG TB24 TAKE 1 TABLET BY MOUTH  DAILY  Patient not taking: Reported on 8/17/2020  Sheron Seed DO Tonya   blood glucose test strips (EXACTECH R-S-G TEST) strip Test 4 times daily as directed  Historical Provider, MD   ACCU-CHEK FASTCLIX LANCETS MISC Testing 4 x a day Dx: S80.09  Historical Provider, MD          Patient-Reported Vitals 8/17/2020   Patient-Reported Weight 180   Patient-Reported Height 5'7\"      There were no vitals filed for this visit. Wt Readings from Last 3 Encounters:   05/18/20 180 lb (81.6 kg)   01/23/20 190 lb 6.4 oz (86.4 kg)   07/12/19 188 lb (85.3 kg)     BP Readings from Last 3 Encounters:   01/23/20 (!) 180/82   07/12/19 138/66       There is no problem list on file for this patient. Immunization History   Administered Date(s) Administered    Influenza Vaccine, unspecified formulation 01/31/2013    Influenza Virus Vaccine 12/15/2008, 10/24/2011, 10/24/2013, 11/19/2015, 11/16/2016, 11/20/2017    Influenza, MDCK Quadv, with preserv IM (Flucelvax 4 yrs and older) 01/23/2019    Influenza, Ministerio , IM, PF (6 mo and older Fluzone, Flulaval, Fluarix, and 3 yrs and older Afluria) 11/19/2015, 11/16/2016, 11/20/2017       No past medical history on file.   Past Surgical History:   Procedure Laterality Date    CORONARY ARTERY BYPASS GRAFT REDO  2016     Family History   Problem Relation Age of Onset    Heart Disease Mother     Heart Disease Father     Diabetes Father      Social History     Socioeconomic History    Marital status:      Spouse name: Not on file    Number of children: Not on file    Years of education: Not on file    Highest education level: Not on file   Occupational History    Not on file   Social Needs    Financial resource strain: Not on file    Food insecurity     Worry: Not on file     Inability: Not on file    Transportation needs     Medical: Not on file     Non-medical: Not on file

## 2020-08-19 ENCOUNTER — TELEPHONE (OUTPATIENT)
Dept: FAMILY MEDICINE CLINIC | Age: 55
End: 2020-08-19

## 2020-08-19 DIAGNOSIS — Z79.4 DIABETES MELLITUS TYPE 2, INSULIN DEPENDENT (HCC): Primary | ICD-10-CM

## 2020-08-19 DIAGNOSIS — E11.9 DIABETES MELLITUS TYPE 2, INSULIN DEPENDENT (HCC): Primary | ICD-10-CM

## 2020-08-19 RX ORDER — INSULIN GLARGINE 100 [IU]/ML
80 INJECTION, SOLUTION SUBCUTANEOUS NIGHTLY
Qty: 5 PEN | Refills: 3 | Status: SHIPPED | OUTPATIENT
Start: 2020-08-19 | End: 2020-11-18

## 2020-08-19 NOTE — TELEPHONE ENCOUNTER
Lantus solostar pen is e-scribed to [facility]. It is typically dispensed in box quantity (5 pens in a box)  The unit converstion from [facility] to Lantus should be same. If using 80 units of Tresiba, it should be 80 units of Lantus.

## 2020-08-19 NOTE — TELEPHONE ENCOUNTER
----- Message from Michele Doni sent at 8/19/2020  4:14 PM EDT -----  Subject: Message to Provider    QUESTIONS  Information for Provider? Patient needs medication changed because   insurance no longer covers. He needs Lantus. Replace Insulin Degludec   (TRESIBA FLEXTOUCH) 200 UNIT/ML SOPN [facility] [address]  [phone] - F [phone]  ---------------------------------------------------------------------------  --------------  Chari HADDAD  What is the best way for the office to contact you? OK to leave message on   voicemail  Preferred Call Back Phone Number? [phone]  ---------------------------------------------------------------------------  --------------  SCRIPT ANSWERS  Relationship to Patient?  Self

## 2020-08-20 ENCOUNTER — TELEPHONE (OUTPATIENT)
Dept: FAMILY MEDICINE CLINIC | Age: 55
End: 2020-08-20

## 2020-08-20 NOTE — TELEPHONE ENCOUNTER
[facility] calling today asking for an increase in the pen needles. The patient is using 4 a day. He is using 1 at night for Lantus and 3 during the day for Humalog. Please advise if this is correct.      Will need to call Walter Persaud back at [phone]; R# [phone]

## 2020-08-21 RX ORDER — PEN NEEDLE, DIABETIC 31 GX5/16"
NEEDLE, DISPOSABLE MISCELLANEOUS
Qty: 1000 EACH | Refills: 3 | Status: SHIPPED | OUTPATIENT
Start: 2020-08-21 | End: 2022-05-24 | Stop reason: SDUPTHER

## 2020-09-08 ENCOUNTER — NURSE TRIAGE (OUTPATIENT)
Dept: OTHER | Facility: CLINIC | Age: 55
End: 2020-09-08

## 2020-09-08 NOTE — TELEPHONE ENCOUNTER
Reason for Disposition   SEVERE swelling (e.g., swelling extends above knee, entire leg is swollen, weeping fluid)    Answer Assessment - Initial Assessment Questions  1. ONSET: \"When did the swelling start? \" (e.g., minutes, hours, days)      2 weeks  2. LOCATION: \"What part of the leg is swollen? \"  \"Are both legs swollen or just one leg? \"  Below both knees  3. SEVERITY: \"How bad is the swelling? \" (e.g., localized; mild, moderate, severe)   - Localized - small area of swelling localized to one leg   - MILD pedal edema - swelling limited to foot and ankle, pitting edema < 1/4 inch (6 mm) deep, rest and elevation eliminate most or all swelling   - MODERATE edema - swelling of lower leg to knee, pitting edema > 1/4 inch (6 mm) deep, rest and elevation only partially reduce swelling   - SEVERE edema - swelling extends above knee, facial or hand swelling present   severe  4. REDNESS: \"Does the swelling look red or infected? \"    yes  5. PAIN: \"Is the swelling painful to touch? \" If so, ask: \"How painful is it? \"   (Scale 1-10; mild, moderate or severe)  no  6. FEVER: \"Do you have a fever? \" If so, ask: \"What is it, how was it measured, and when did it start? \"   no  7. CAUSE: \"What do you think is causing the leg swelling? \"  diabetes  8. MEDICAL HISTORY: \"Do you have a history of heart failure, kidney disease, liver failure, or cancer? \"   diabetic  9. RECURRENT SYMPTOM: \"Have you had leg swelling before? \" If so, ask: \"When was the last time? \" \"What happened that time? \"    no  10. OTHER SYMPTOMS: \"Do you have any other symptoms? \" (e.g., chest pain, difficulty breathing)  no    Protocols used: LEG SWELLING AND EDEMA-ADULT-OH    BP this am was 237/111

## 2020-09-14 NOTE — TELEPHONE ENCOUNTER
Requested Prescriptions     Pending Prescriptions Disp Refills    sAXagliptin-metFORMIN ER (KOMBIGLYZE XR) 2.5-1000 MG TB24 [Pharmacy Med Name: KOMBIGLYZE XR 2.5-1000MG TABLET] 90 tablet 3     Sig: TAKE 1 TABLET BY MOUTH  DAILY     [employer]

## 2020-09-15 RX ORDER — SAXAGLIPTIN AND METFORMIN HYDROCHLORIDE 2.5; 1 MG/1; MG/1
TABLET, FILM COATED, EXTENDED RELEASE ORAL
Qty: 90 TABLET | Refills: 3 | Status: SHIPPED | OUTPATIENT
Start: 2020-09-15 | End: 2020-09-30

## 2020-09-30 ENCOUNTER — OFFICE VISIT (OUTPATIENT)
Dept: ENDOCRINOLOGY | Age: 55
End: 2020-09-30
Payer: COMMERCIAL

## 2020-09-30 VITALS
OXYGEN SATURATION: 97 % | BODY MASS INDEX: 31.24 KG/M2 | RESPIRATION RATE: 18 BRPM | WEIGHT: 194.4 LBS | HEIGHT: 66 IN | DIASTOLIC BLOOD PRESSURE: 78 MMHG | SYSTOLIC BLOOD PRESSURE: 132 MMHG

## 2020-09-30 LAB — HBA1C MFR BLD: 8.1 %

## 2020-09-30 PROCEDURE — G8417 CALC BMI ABV UP PARAM F/U: HCPCS | Performed by: INTERNAL MEDICINE

## 2020-09-30 PROCEDURE — 99204 OFFICE O/P NEW MOD 45 MIN: CPT | Performed by: INTERNAL MEDICINE

## 2020-09-30 PROCEDURE — 2022F DILAT RTA XM EVC RTNOPTHY: CPT | Performed by: INTERNAL MEDICINE

## 2020-09-30 PROCEDURE — 83036 HEMOGLOBIN GLYCOSYLATED A1C: CPT | Performed by: INTERNAL MEDICINE

## 2020-09-30 PROCEDURE — G8427 DOCREV CUR MEDS BY ELIG CLIN: HCPCS | Performed by: INTERNAL MEDICINE

## 2020-09-30 RX ORDER — EMPAGLIFLOZIN 10 MG/1
1 TABLET, FILM COATED ORAL DAILY
Qty: 90 TABLET | Refills: 1 | Status: SHIPPED | OUTPATIENT
Start: 2020-09-30 | End: 2020-12-08 | Stop reason: SDUPTHER

## 2020-09-30 RX ORDER — HYDROCHLOROTHIAZIDE 25 MG/1
25 TABLET ORAL DAILY
COMMUNITY
Start: 2020-09-09 | End: 2021-02-18 | Stop reason: SDUPTHER

## 2020-09-30 NOTE — PROGRESS NOTES
Seen as new patient for diabetes  Referred by Dr. Ryan Diehl    Diagnosed with Type 2 diabetes mellitus in 2002    Known diabetic complications: Retinopathy, neuropathy  Uncontrolled, moderate    Current diabetic medications     Reports has had to change medication due to insurance    Lantus 80 units  Humalog SSI  12 units TID   2 for 50 > 150    Kombiglyze  Amaryl 4mg  Metformin 1000mg with supper    Last A1c     8.1%<-----8.7%<--- 9.2 <--- 8.1 <--- 8.4    Prior visit with dietician: Yes   Current diet: on average, 3 meals per day   Current exercise: Exercise  Current monitoring regimen: home blood tests - 2 times daily     Has brought blood glucose log/meter: yes   Home blood sugar records:   Any episodes of hypoglycemia? Worsened by high CHO    CABG in 2016  PCI 2002    Hyperlipidemia:   For   Years  Takes lipitor 80mg  Controlled  LDL 60 on 1/20    Hypertension for years  Stable now reports highs in the past  Takes norvasc 5mg Metoprolol 50mg BID ramipril 10mg HCTZ 25mg    Last eye exam: 9/19  Last foot exam: 9/20    He has microalbuminuria  Last microalbumin to creatinine ratio: 121 on 1/20    He has neuropathy  On neurontin 300mg   FH: Dad CAD   Mom CAD    PMH  1.DM  2. HLD  3.CAD    SH: No smoking    Past Surgical History:   Procedure Laterality Date    CORONARY ARTERY BYPASS GRAFT REDO  2016     Current Outpatient Medications   Medication Sig Dispense Refill    sAXagliptin-metFORMIN ER (KOMBIGLYZE XR) 2.5-1000 MG TB24 TAKE 1 TABLET BY MOUTH  DAILY 90 tablet 3    Insulin Pen Needle (B-D UF III MINI PEN NEEDLES) 31G X 5 MM MISC Inject insulin 4 times a day.  1000 each 3    insulin glargine (LANTUS SOLOSTAR) 100 UNIT/ML injection pen Inject 80 Units into the skin nightly 5 pen 3    ONE TOUCH ULTRASOFT LANCETS MISC 1 each by Does not apply route 4 times daily (before meals and nightly) 100 each 3    blood glucose test strips (ASCENSIA AUTODISC VI;ONE TOUCH ULTRA TEST VI) strip 1 each by In Vitro route 4 times daily (before meals and nightly) As needed. 100 each 3    nitroGLYCERIN (NITROSTAT) 0.4 MG SL tablet Place 1 tablet under the tongue every 5 minutes as needed for Chest pain 25 tablet 0    insulin lispro, 1 Unit Dial, (HUMALOG KWIKPEN) 100 UNIT/ML SOPN INJECT SUBCUTANEOUSLY 3  TIMES DAILY PER SLIDING  SCALE PLUS 12 UNITS MEAL  CORRECTION DOSAGE. MAXIMUM  60 UNITS DAILY 45 mL 3    metoprolol tartrate (LOPRESSOR) 50 MG tablet Take 1 tablet by mouth 2 times daily 60 tablet 5    gabapentin (NEURONTIN) 300 MG capsule Take 1 capsule by mouth nightly for 180 days. Intended supply: 30 days 30 capsule 5    amLODIPine (NORVASC) 10 MG tablet Take 1 tablet by mouth daily 90 tablet 1    metFORMIN (GLUCOPHAGE) 1000 MG tablet Take 1 tablet by mouth Daily with supper 90 tablet 1    atorvastatin (LIPITOR) 80 MG tablet Take 1 tablet by mouth daily 90 tablet 1    metFORMIN (GLUCOPHAGE) 1000 MG tablet Take 1 tablet by mouth Daily with supper (Patient not taking: Reported on 8/17/2020) 30 tablet 0    Blood Glucose Monitoring Suppl (ONE TOUCH ULTRA MINI) w/Device KIT 1 kit by Does not apply route 4 times daily (before meals and nightly) 1 kit 0    glimepiride (AMARYL) 4 MG tablet Take 1 tablet by mouth nightly 90 tablet 3    ramipril (ALTACE) 10 MG capsule Take 1 capsule by mouth daily 90 capsule 3    blood glucose monitor strips Test 2 times a day & as needed for symptoms of irregular blood glucose.  (Patient not taking: Reported on 8/17/2020) 100 strip 0    glucose monitoring kit (FREESTYLE) monitoring kit 1 kit by Does not apply route 4 times daily (before meals and nightly) 1 kit 0    sAXagliptin-metFORMIN ER (KOMBIGLYZE XR) 2.5-1000 MG TB24 TAKE 1 TABLET BY MOUTH  DAILY (Patient not taking: Reported on 8/17/2020) 30 tablet 0    aspirin 325 MG tablet Take 325 mg by mouth      blood glucose test strips (EXACTECH R-S-G TEST) strip Test 4 times daily as directed      ibuprofen (ADVIL;MOTRIN) 800 MG tablet Take

## 2020-10-07 LAB
FOLLICLE STIMULATING HORMONE: 6.78 MIU/ML
LUTEINIZING HORMONE: 6.2 MIU/ML (ref 1.2–8.6)
PROLACTIN: 12.3 NG/ML (ref 4.04–15.2)
TSH ULTRASENSITIVE: 3.53 MCIU/ML (ref 0.27–4.2)

## 2020-10-09 LAB
ALDOSTERONE: 15.5 NG/DL
SEX HORMONE BINDING GLOBULIN: 34 NMOL/L (ref 11–80)
TESTOSTERONE FREE PERCENT: 1.9 % (ref 1.6–2.9)
TESTOSTERONE FREE-MALE: 91 PG/ML (ref 47–244)
TESTOSTERONE TOTAL-MALE: 488 NG/DL (ref 300–890)

## 2020-10-10 LAB
COMMENT: ABNORMAL
METANEPHRINE: <0.1 NMOL/L (ref 0–0.49)
NORMETANEPHRINE PLASMA: 1.19 NMOL/L (ref 0–0.89)

## 2020-10-19 LAB — RENIN PLASMA: 6 NG/ML/HR

## 2020-10-21 DIAGNOSIS — I10 ESSENTIAL HYPERTENSION: ICD-10-CM

## 2020-10-25 LAB
CREATININE 24 HOUR URINE: 2100 MG/D (ref 800–2100)
CREATININE URINE: 84 MG/DL
HOURS COLLECTED: 24
METANEPHRINE INTREP URINE: NORMAL
METANEPHRINE UG/G CRE: 62 UG/G CRT (ref 0–300)
METANEPHRINE, UR-PER VOL: 52 UG/L
METANEPHRINES URINE: 130 UG/D (ref 55–320)
NORMETANEPHRINE 24 HOUR URINE: 458 UG/D (ref 114–865)
NORMETANEPHRINE, (G/CRT): 218 UG/G CRT (ref 0–400)
NORMETANEPHRINES, NMOL/L: 183 UG/L
URINE TOTAL VOLUME: 2500

## 2020-11-13 ENCOUNTER — TELEPHONE (OUTPATIENT)
Dept: FAMILY MEDICINE CLINIC | Age: 55
End: 2020-11-13

## 2020-11-15 RX ORDER — METOPROLOL TARTRATE 50 MG/1
50 TABLET, FILM COATED ORAL 2 TIMES DAILY
Qty: 180 TABLET | Refills: 1 | Status: SHIPPED | OUTPATIENT
Start: 2020-11-15 | End: 2021-02-18 | Stop reason: SDUPTHER

## 2020-11-18 RX ORDER — INSULIN GLARGINE 100 [IU]/ML
INJECTION, SOLUTION SUBCUTANEOUS
Qty: 60 ML | Refills: 3 | Status: SHIPPED | OUTPATIENT
Start: 2020-11-18 | End: 2021-02-18 | Stop reason: SDUPTHER

## 2020-12-01 RX ORDER — AMLODIPINE BESYLATE 10 MG/1
10 TABLET ORAL DAILY
Qty: 90 TABLET | Refills: 3 | Status: SHIPPED | OUTPATIENT
Start: 2020-12-01 | End: 2021-02-18 | Stop reason: SDUPTHER

## 2020-12-01 RX ORDER — ATORVASTATIN CALCIUM 80 MG/1
80 TABLET, FILM COATED ORAL DAILY
Qty: 90 TABLET | Refills: 3 | Status: SHIPPED | OUTPATIENT
Start: 2020-12-01 | End: 2021-02-18 | Stop reason: SDUPTHER

## 2020-12-08 RX ORDER — EMPAGLIFLOZIN 10 MG/1
1 TABLET, FILM COATED ORAL DAILY
Qty: 90 TABLET | Refills: 1 | Status: SHIPPED | OUTPATIENT
Start: 2020-12-08 | End: 2020-12-23 | Stop reason: SDUPTHER

## 2020-12-08 NOTE — TELEPHONE ENCOUNTER
Medication:   Requested Prescriptions     Pending Prescriptions Disp Refills    empagliflozin (JARDIANCE) 10 MG tablet 90 tablet 1     Sig: Take 1 tablet by mouth daily    Dulaglutide 0.75 MG/0.5ML SOPN 4 pen 2     Sig: Inject 0.75 mg into the skin once a week       Last Filled:      Patient Phone Number: [phone] (home) [phone] (work)    Last appt: 9/30/2020   Next appt: 12/23/2020    Last Labs DM:   Lab Results   Component Value Date    LABA1C 8.1 09/30/2020

## 2020-12-23 ENCOUNTER — OFFICE VISIT (OUTPATIENT)
Dept: ENDOCRINOLOGY | Age: 55
End: 2020-12-23
Payer: COMMERCIAL

## 2020-12-23 VITALS
HEIGHT: 66 IN | WEIGHT: 185.4 LBS | BODY MASS INDEX: 29.8 KG/M2 | RESPIRATION RATE: 18 BRPM | DIASTOLIC BLOOD PRESSURE: 73 MMHG | SYSTOLIC BLOOD PRESSURE: 130 MMHG | HEART RATE: 61 BPM | OXYGEN SATURATION: 96 %

## 2020-12-23 LAB — HBA1C MFR BLD: 7.7 %

## 2020-12-23 PROCEDURE — 1036F TOBACCO NON-USER: CPT | Performed by: INTERNAL MEDICINE

## 2020-12-23 PROCEDURE — G8484 FLU IMMUNIZE NO ADMIN: HCPCS | Performed by: INTERNAL MEDICINE

## 2020-12-23 PROCEDURE — 3051F HG A1C>EQUAL 7.0%<8.0%: CPT | Performed by: INTERNAL MEDICINE

## 2020-12-23 PROCEDURE — 83036 HEMOGLOBIN GLYCOSYLATED A1C: CPT | Performed by: INTERNAL MEDICINE

## 2020-12-23 PROCEDURE — G8427 DOCREV CUR MEDS BY ELIG CLIN: HCPCS | Performed by: INTERNAL MEDICINE

## 2020-12-23 PROCEDURE — 2022F DILAT RTA XM EVC RTNOPTHY: CPT | Performed by: INTERNAL MEDICINE

## 2020-12-23 PROCEDURE — 3017F COLORECTAL CA SCREEN DOC REV: CPT | Performed by: INTERNAL MEDICINE

## 2020-12-23 PROCEDURE — G8417 CALC BMI ABV UP PARAM F/U: HCPCS | Performed by: INTERNAL MEDICINE

## 2020-12-23 PROCEDURE — 99214 OFFICE O/P EST MOD 30 MIN: CPT | Performed by: INTERNAL MEDICINE

## 2020-12-23 RX ORDER — EMPAGLIFLOZIN 10 MG/1
1 TABLET, FILM COATED ORAL DAILY
Qty: 90 TABLET | Refills: 1 | Status: SHIPPED | OUTPATIENT
Start: 2020-12-23 | End: 2021-02-18 | Stop reason: SDUPTHER

## 2020-12-23 NOTE — PROGRESS NOTES
Seen as  patient for diabetes  Referred by Dr. Lo Camilo  Interim:    States feeling better  Trulicity working good    Diagnosed with Type 2 diabetes mellitus in 2002    Known diabetic complications: Retinopathy, neuropathy  Uncontrolled, moderate    Current diabetic medications     Reports has had to change medication due to insurance    Lantus 80 units  Humalog SSI  14 units TID   2 for 50 > 150    Jardiance  Trulicity  Amaryl 4mg  Metformin 1000mg with supper    Last A1c     7.7%<----8.1%<-----8.7%<--- 9.2 <--- 8.1 <--- 8.4    Prior visit with dietician: Yes   Current diet: on average, 3 meals per day   Current exercise: Exercise  Current monitoring regimen: home blood tests - 2 times daily     Has brought blood glucose log/meter: yes   Home blood sugar records: 80  Any episodes of hypoglycemia? Worsened by high CHO    CABG in 2016  PCI 2002    Hyperlipidemia:   For   Years  Takes lipitor 80mg  Controlled  LDL 60 on 1/20    Hypertension for years  Stable now reports highs in the past  Takes norvasc 5mg Metoprolol 50mg BID ramipril 10mg HCTZ 25mg    Plasma MN  1.19    nl 24 hour urine level    Testosterone 488 Prolactin 12.3 TSH nl    Aldosterone 15.5 renin 6      Last eye exam: 9/19  Last foot exam: 9/20    He has microalbuminuria  Last microalbumin to creatinine ratio: 121 on 1/20    He has neuropathy  On neurontin 300mg   FH: Dad CAD   Mom CAD    PMH  1.DM  2. HLD  3.CAD    SH: No smoking    Past Surgical History:   Procedure Laterality Date    CORONARY ARTERY BYPASS GRAFT REDO  2016     Current Outpatient Medications   Medication Sig Dispense Refill    empagliflozin (JARDIANCE) 10 MG tablet Take 1 tablet by mouth daily 90 tablet 1    Dulaglutide 0.75 MG/0.5ML SOPN Inject 0.75 mg into the skin once a week 4 pen 2    amLODIPine (NORVASC) 10 MG tablet TAKE 1 TABLET BY MOUTH  DAILY 90 tablet 3    metFORMIN (GLUCOPHAGE) 1000 MG tablet TAKE 1 TABLET BY MOUTH  DAILY WITH SUPPER 90 tablet 3    atorvastatin (LIPITOR) 80 MG tablet TAKE 1 TABLET BY MOUTH  DAILY 90 tablet 3    LANTUS SOLOSTAR 100 UNIT/ML injection pen INJECT SUBCUTANEOUSLY 80  UNITS AT NIGHT 60 mL 3    metoprolol tartrate (LOPRESSOR) 50 MG tablet Take 1 tablet by mouth 2 times daily 180 tablet 1    hydroCHLOROthiazide (HYDRODIURIL) 25 MG tablet Take 25 mg by mouth daily      Insulin Pen Needle (B-D UF III MINI PEN NEEDLES) 31G X 5 MM MISC Inject insulin 4 times a day. 1000 each 3    ONE TOUCH ULTRASOFT LANCETS MISC 1 each by Does not apply route 4 times daily (before meals and nightly) 100 each 3    blood glucose test strips (ASCENSIA AUTODISC VI;ONE TOUCH ULTRA TEST VI) strip 1 each by In Vitro route 4 times daily (before meals and nightly) As needed. 100 each 3    nitroGLYCERIN (NITROSTAT) 0.4 MG SL tablet Place 1 tablet under the tongue every 5 minutes as needed for Chest pain 25 tablet 0    insulin lispro, 1 Unit Dial, (HUMALOG KWIKPEN) 100 UNIT/ML SOPN INJECT SUBCUTANEOUSLY 3  TIMES DAILY PER SLIDING  SCALE PLUS 12 UNITS MEAL  CORRECTION DOSAGE. MAXIMUM  60 UNITS DAILY 45 mL 3    gabapentin (NEURONTIN) 300 MG capsule Take 1 capsule by mouth nightly for 180 days. Intended supply: 30 days 30 capsule 5    Blood Glucose Monitoring Suppl (ONE TOUCH ULTRA MINI) w/Device KIT 1 kit by Does not apply route 4 times daily (before meals and nightly) 1 kit 0    glimepiride (AMARYL) 4 MG tablet Take 1 tablet by mouth nightly 90 tablet 3    ramipril (ALTACE) 10 MG capsule Take 1 capsule by mouth daily 90 capsule 3    blood glucose monitor strips Test 2 times a day & as needed for symptoms of irregular blood glucose.  100 strip 0    glucose monitoring kit (FREESTYLE) monitoring kit 1 kit by Does not apply route 4 times daily (before meals and nightly) 1 kit 0    aspirin 325 MG tablet Take 325 mg by mouth      blood glucose test strips (EXACTECH R-S-G TEST) strip Test 4 times daily as directed      ibuprofen (ADVIL;MOTRIN) 800 MG tablet Take 200 mg by mouth as needed       ACCU-CHEK FASTCLIX LANCETS MISC Testing 4 x a day Dx: E11.42       No current facility-administered medications for this visit. Review of Systems  Please see scanned document dated and signed      Objective:     Vitals:    12/23/20 1428   BP: 130/73   Pulse: 61   Resp: 18   SpO2: 96%        /73   Pulse 61   Resp 18   Ht 5' 6\" (1.676 m)   Wt 185 lb 6.4 oz (84.1 kg)   SpO2 96%   BMI 29.92 kg/m²   Wt Readings from Last 3 Encounters:   12/23/20 185 lb 6.4 oz (84.1 kg)   09/30/20 194 lb 6.4 oz (88.2 kg)   05/18/20 180 lb (81.6 kg)     Constitutional: Well-developed, alert, appears stated age, cooperative, in no acute distress  H/E/N/M/T:atraumatic, normocephalic, external ears, nose, lips normal without lesions  No facial puffiness, no hoarseness    Eyes: Arcus Senilis is not present, extraocular muscles are intact  Neck: supple, trachea midline, acanthosis nigricance is not present. Thyroid: gland size is normal, non-tender on palpation  Respiratory: breathing is unlabored, lungs are clear to auscultations. Cardiovascular: regular rate and rhythm, S1, S2, regular rate and rhythm, no murmur, rub or gallop.    Skeletal muscular: no kyphosis, no gross abnormalities  Skin: Xanthoma/Xanthelasmas are  not present  Psychiatric: Judgement and Insight:  judgement and insight appear normal  Neuro: Normal without focal findings, speech is spontaneous, and movements are coordinated, alert and oriented x3   Skeletal foot exam is normal, no skin lesions, toenails are normal, pulses are normal, 10 g monofilament is decreased  +1 swelling  Rash on lower legs    Lab Reviewed   No components found for: CHLPL  Lab Results   Component Value Date    TRIG 141 01/15/2020     Lab Results   Component Value Date    HDL 33 (L) 01/15/2020     Lab Results   Component Value Date    LDLCALC 60 01/15/2020     No results found for: LABVLDL  Lab Results   Component Value Date    LABA1C 8.1 09/30/2020 Assessment:     Lynne Paige is a 54 y.o. male with :    1.T2DM : Fairly controlled, on insulin. Discussed goals, risk of complications . Advised diet changes. Started on trulicity and jardiance . A1c improved. Adjust humalog. Needs to bring log  May need CGM. Advised to keep CHO constant  2. HTN : Blood pressure at goal, on multiple medications, nl  Metanephrine,  aldosterone  3. HLD : LDL at goal  4. Microalbuminuria : On Ramipril. 5.Rash : Advised to see dermatologist  6. Leg swelling:  Less in the  morning, advised can try compression stockings, also see cardiologist if persists. 7.Fatigue: nl TSH, testosterone . Advised MVT  8. Neuropathy  9. CAD    Plan:      Lantus 80 units   Humalog 15 units AC TID   SSI 2 for 50 > 661    Trulicity   Jardiance   Advise to check blood sugar 4 times a day   Patient to send blood sugar log for titration. Advise to exercise regularly. Advise to low simple carbohydrate and protein with each  meal diet. Diabetes Care: recommend yearly eye exam, foot exam and urine microalbumin to   creatinine ratio. Patient is up-to-date.

## 2021-02-15 ENCOUNTER — TELEPHONE (OUTPATIENT)
Dept: FAMILY MEDICINE CLINIC | Age: 56
End: 2021-02-15

## 2021-02-16 NOTE — TELEPHONE ENCOUNTER
Please call patient to offer reschedule Feb 16 appt (due to the inclement weather). I also sent Funinhandt message and left voice mail.

## 2021-02-18 ENCOUNTER — OFFICE VISIT (OUTPATIENT)
Dept: FAMILY MEDICINE CLINIC | Age: 56
End: 2021-02-18
Payer: COMMERCIAL

## 2021-02-18 VITALS
WEIGHT: 184.8 LBS | SYSTOLIC BLOOD PRESSURE: 134 MMHG | OXYGEN SATURATION: 95 % | HEART RATE: 71 BPM | TEMPERATURE: 95.4 F | BODY MASS INDEX: 29.83 KG/M2 | DIASTOLIC BLOOD PRESSURE: 68 MMHG | RESPIRATION RATE: 16 BRPM

## 2021-02-18 DIAGNOSIS — I10 ESSENTIAL HYPERTENSION: ICD-10-CM

## 2021-02-18 DIAGNOSIS — Z79.4 DIABETES MELLITUS TYPE 2, INSULIN DEPENDENT (HCC): ICD-10-CM

## 2021-02-18 DIAGNOSIS — E11.9 DIABETES MELLITUS TYPE 2, INSULIN DEPENDENT (HCC): ICD-10-CM

## 2021-02-18 DIAGNOSIS — E11.42 DIABETIC POLYNEUROPATHY ASSOCIATED WITH TYPE 2 DIABETES MELLITUS (HCC): ICD-10-CM

## 2021-02-18 PROCEDURE — 99214 OFFICE O/P EST MOD 30 MIN: CPT | Performed by: FAMILY MEDICINE

## 2021-02-18 PROCEDURE — 1036F TOBACCO NON-USER: CPT | Performed by: FAMILY MEDICINE

## 2021-02-18 PROCEDURE — G8427 DOCREV CUR MEDS BY ELIG CLIN: HCPCS | Performed by: FAMILY MEDICINE

## 2021-02-18 PROCEDURE — G8417 CALC BMI ABV UP PARAM F/U: HCPCS | Performed by: FAMILY MEDICINE

## 2021-02-18 PROCEDURE — 3017F COLORECTAL CA SCREEN DOC REV: CPT | Performed by: FAMILY MEDICINE

## 2021-02-18 PROCEDURE — G8484 FLU IMMUNIZE NO ADMIN: HCPCS | Performed by: FAMILY MEDICINE

## 2021-02-18 PROCEDURE — 3046F HEMOGLOBIN A1C LEVEL >9.0%: CPT | Performed by: FAMILY MEDICINE

## 2021-02-18 PROCEDURE — 2022F DILAT RTA XM EVC RTNOPTHY: CPT | Performed by: FAMILY MEDICINE

## 2021-02-18 RX ORDER — INSULIN GLARGINE 100 [IU]/ML
INJECTION, SOLUTION SUBCUTANEOUS
Qty: 60 ML | Refills: 3 | Status: SHIPPED | OUTPATIENT
Start: 2021-02-18 | End: 2021-09-20

## 2021-02-18 RX ORDER — METOPROLOL TARTRATE 50 MG/1
50 TABLET, FILM COATED ORAL 2 TIMES DAILY
Qty: 180 TABLET | Refills: 3 | Status: SHIPPED | OUTPATIENT
Start: 2021-02-18 | End: 2021-11-16 | Stop reason: SDUPTHER

## 2021-02-18 RX ORDER — GABAPENTIN 300 MG/1
300 CAPSULE ORAL NIGHTLY
Qty: 90 CAPSULE | Refills: 3 | Status: SHIPPED | OUTPATIENT
Start: 2021-02-18 | End: 2021-04-14

## 2021-02-18 RX ORDER — HYDROCHLOROTHIAZIDE 25 MG/1
25 TABLET ORAL DAILY
Qty: 90 TABLET | Refills: 3 | Status: SHIPPED | OUTPATIENT
Start: 2021-02-18 | End: 2021-11-16 | Stop reason: SDUPTHER

## 2021-02-18 RX ORDER — EMPAGLIFLOZIN 10 MG/1
1 TABLET, FILM COATED ORAL DAILY
Qty: 90 TABLET | Refills: 3 | Status: CANCELLED | OUTPATIENT
Start: 2021-02-18

## 2021-02-18 RX ORDER — EMPAGLIFLOZIN 10 MG/1
1 TABLET, FILM COATED ORAL DAILY
Qty: 90 TABLET | Refills: 1 | Status: SHIPPED | OUTPATIENT
Start: 2021-02-18 | End: 2021-06-29

## 2021-02-18 RX ORDER — RAMIPRIL 10 MG/1
10 CAPSULE ORAL DAILY
Qty: 90 CAPSULE | Refills: 3 | Status: SHIPPED | OUTPATIENT
Start: 2021-02-18 | End: 2021-11-16 | Stop reason: SDUPTHER

## 2021-02-18 RX ORDER — GLIMEPIRIDE 4 MG/1
4 TABLET ORAL NIGHTLY
Qty: 90 TABLET | Refills: 3 | Status: SHIPPED | OUTPATIENT
Start: 2021-02-18 | End: 2021-11-16 | Stop reason: SDUPTHER

## 2021-02-18 RX ORDER — INSULIN LISPRO 100 [IU]/ML
INJECTION, SOLUTION INTRAVENOUS; SUBCUTANEOUS
Qty: 45 ML | Refills: 3 | Status: SHIPPED | OUTPATIENT
Start: 2021-02-18 | End: 2021-10-04

## 2021-02-18 RX ORDER — AMLODIPINE BESYLATE 10 MG/1
10 TABLET ORAL DAILY
Qty: 90 TABLET | Refills: 3 | Status: SHIPPED | OUTPATIENT
Start: 2021-02-18 | End: 2021-11-16 | Stop reason: SDUPTHER

## 2021-02-18 RX ORDER — NITROGLYCERIN 0.4 MG/1
0.4 TABLET SUBLINGUAL EVERY 5 MIN PRN
Qty: 25 TABLET | Refills: 0 | Status: SHIPPED | OUTPATIENT
Start: 2021-02-18 | End: 2021-11-16 | Stop reason: SDUPTHER

## 2021-02-18 RX ORDER — ATORVASTATIN CALCIUM 80 MG/1
80 TABLET, FILM COATED ORAL DAILY
Qty: 90 TABLET | Refills: 3 | Status: SHIPPED | OUTPATIENT
Start: 2021-02-18 | End: 2021-11-16 | Stop reason: SDUPTHER

## 2021-02-18 SDOH — ECONOMIC STABILITY: FOOD INSECURITY: WITHIN THE PAST 12 MONTHS, THE FOOD YOU BOUGHT JUST DIDN'T LAST AND YOU DIDN'T HAVE MONEY TO GET MORE.: NEVER TRUE

## 2021-02-18 ASSESSMENT — PATIENT HEALTH QUESTIONNAIRE - PHQ9
SUM OF ALL RESPONSES TO PHQ QUESTIONS 1-9: 0
SUM OF ALL RESPONSES TO PHQ QUESTIONS 1-9: 0

## 2021-02-18 NOTE — PATIENT INSTRUCTIONS
PLEASE ASK THE EYE DOCTOR WHAT THEY THINK OF LINK OF TRULICITY AND EYE RETINOPATHY. THE ENDOCRINOLOGIST AND CARDIOLOGIST FIND A LOT OF BENEFIT FOR HEALTH ON THIS MEDICATION. IF YOU DECIDE TO TRY THE NEUROPATHY CLINIC, LET YOUR PCP KNOW WHAT YOU THINK. CHECK WITH DR. Alanna Kovacs IF NEED TO DO LABWORK BEFORE APPT.    [address] [place] [address]

## 2021-02-18 NOTE — Clinical Note
Good to see Coby Curtis He is doing well. Didn't want him to run out of maintenance meds. Sent rx for Jardiance and Trulicity rx to his mail order. He'll see you end of March. Thanks.

## 2021-02-18 NOTE — PROGRESS NOTES
[facility]  Progress Note  Miguelina Mcknight, DO Aneta Bryson  1965    02/18/21    Chief Complaint:   Aneta Bryson is a 54 y.o. male who is here for diabetes and retinopathy and neuropathy        HPI:   My clinic had a mixup of scheduling. Patient originally scheduled to see me 2 days ago but we had a scheduling error and appreciated patient rescheduling and seeing me in person today. Mandy Dai otherwise is doing well. He realizes while he has a number of maintenance medications he seems to understand importance of the medications. His care team includes [address] [place] [address] endocrinologist and [facility] cardiology including me as his primary care doctor. He is about to run out of Jardiance and Trulicity and requests refills of those to the mail order. Medication typically under endocrinologists name but I will send an intraoffice message to Dr. King Vargas. 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 He stands a lot. He does mention to me that the neuropathy is bothersome to him at times. He does have some concerns about balance but there have been no recent falls. He is interested to see what neuropathy clinic has to offer in the local area. Additionally he has his eye appointment with his eye doctor this spring. Lab Results   Component Value Date    LABA1C 7.7 12/23/2020     Lab Results   Component Value Date     08/05/2020         ROS negative for headache, visionchanges, chest pain, shortness of breath, abdominal pain, urinary sx, bowel changes. Past medical, surgical, and social history reviewed. and allergies reviewed. No Known Allergies  Prior to Visit Medications    Medication Sig Taking?  Authorizing Provider   amLODIPine (NORVASC) 10 MG tablet Take 1 tablet by mouth daily Yes Jimena Castaneda,    metFORMIN (GLUCOPHAGE) 1000 MG tablet Take 1 tablet by mouth Daily with supper Yes Oumou Mantilla, DO atorvastatin (LIPITOR) 80 MG tablet Take 1 tablet by mouth daily Yes Meg Hurtado DO   insulin glargine (LANTUS SOLOSTAR) 100 UNIT/ML injection pen INJECT SUBCUTANEOUSLY 80  UNITS AT NIGHT Yes Carrie Castaneda DO   metoprolol tartrate (LOPRESSOR) 50 MG tablet Take 1 tablet by mouth 2 times daily Yes Carrie Castaneda DO   hydroCHLOROthiazide (HYDRODIURIL) 25 MG tablet Take 1 tablet by mouth daily Yes Carrie Castaneda DO   blood glucose test strips (ASCENSIA AUTODISC VI;ONE TOUCH ULTRA TEST VI) strip 1 each by In Vitro route 4 times daily (before meals and nightly) As needed. Yes Carrie Castaneda DO   nitroGLYCERIN (NITROSTAT) 0.4 MG SL tablet Place 1 tablet under the tongue every 5 minutes as needed for Chest pain Yes Carrie Castaneda DO   insulin lispro, 1 Unit Dial, (HUMALOG KWIKPEN) 100 UNIT/ML SOPN INJECT SUBCUTANEOUSLY 3  TIMES DAILY PER SLIDING  SCALE PLUS 12 UNITS MEAL  CORRECTION DOSAGE. MAXIMUM  60 UNITS DAILY Yes Carrie Castaneda DO   gabapentin (NEURONTIN) 300 MG capsule Take 1 capsule by mouth nightly for 180 days. Intended supply: 30 days Yes Meg Hurtado DO   glimepiride (AMARYL) 4 MG tablet Take 1 tablet by mouth nightly Yes Meg Hurtado DO   ramipril (ALTACE) 10 MG capsule Take 1 capsule by mouth daily Yes Carrie Castaneda DO   empagliflozin (JARDIANCE) 10 MG tablet Take 1 tablet by mouth daily Yes Carrie Castaneda DO   Dulaglutide 0.75 MG/0.5ML SOPN Inject 0.75 mg into the skin once a week Yes Carrie Castaneda DO   Insulin Pen Needle (B-D UF III MINI PEN NEEDLES) 31G X 5 MM MISC Inject insulin 4 times a day. Yes Meg Hurtado, DO   blood glucose monitor strips Test 2 times a day & as needed for symptoms of irregular blood glucose.  Yes Meg Hurtado, DO glucose monitoring kit (FREESTYLE) monitoring kit 1 kit by Does not apply route 4 times daily (before meals and nightly) Yes Reinaldo Castaneda, DO   aspirin 325 MG tablet Take 325 mg by mouth Yes Historical Provider, MD   ibuprofen (ADVIL;MOTRIN) 800 MG tablet Take 200 mg by mouth as needed  Yes Historical Provider, MD   ACCU-CHEK FASTCLIX LANCETS MISC Testing 4 x a day Dx: D41.05 Yes Historical Provider, MD          Vitals:    02/18/21 0845 02/18/21 0921   BP: (!) 150/80 134/68   Pulse: 71    Resp: 16    Temp: 95.4 °F (35.2 °C)    TempSrc: Tympanic    SpO2: 95%    Weight: 184 lb 12.8 oz (83.8 kg)       Wt Readings from Last 3 Encounters:   02/18/21 184 lb 12.8 oz (83.8 kg)   12/23/20 185 lb 6.4 oz (84.1 kg)   09/30/20 194 lb 6.4 oz (88.2 kg)     BP Readings from Last 3 Encounters:   02/18/21 134/68   12/23/20 130/73   09/30/20 132/78       There is no problem list on file for this patient. Immunization History   Administered Date(s) Administered    Influenza Vaccine, unspecified formulation 01/31/2013    Influenza Virus Vaccine 12/15/2008, 10/24/2011, 10/24/2013, 11/19/2015, 11/16/2016, 11/20/2017    Influenza, MDCK Quadv, with preserv IM (Flucelvax 4 yrs and older) 01/23/2019    Influenza, [place] Fleeting, IM, PF (6 mo and older Fluzone, Flulaval, Fluarix, and 3 yrs and older Afluria) 11/19/2015, 11/16/2016, 11/20/2017       No past medical history on file.   Past Surgical History:   Procedure Laterality Date    CORONARY ARTERY BYPASS GRAFT REDO  2016     Family History   Problem Relation Age of Onset    Heart Disease Mother     Heart Disease Father     Diabetes Father      Social History     Socioeconomic History    Marital status:      Spouse name: Not on file    Number of children: Not on file    Years of education: Not on file    Highest education level: Not on file   Occupational History    Not on file   Social Needs    Financial resource strain: Somewhat hard    Food insecurity Worry: Never true     Inability: Never true    Transportation needs     Medical: No     Non-medical: No   Tobacco Use    Smoking status: Never Smoker    Smokeless tobacco: Never Used   Substance and Sexual Activity    Alcohol use: Not Currently    Drug use: Never    Sexual activity: Not on file   Lifestyle    Physical activity     Days per week: Not on file     Minutes per session: Not on file    Stress: Not on file   Relationships    Social connections     Talks on phone: Not on file     Gets together: Not on file     Attends Christian service: Not on file     Active member of club or organization: Not on file     Attends meetings of clubs or organizations: Not on file     Relationship status: Not on file    Intimate partner violence     Fear of current or ex partner: Not on file     Emotionally abused: Not on file     Physically abused: Not on file     Forced sexual activity: Not on file   Other Topics Concern    Not on file   Social History Narrative    Not on file       O: /68   Pulse 71   Temp 95.4 °F (35.2 °C) (Tympanic)   Resp 16   Wt 184 lb 12.8 oz (83.8 kg)   SpO2 95%   BMI 29.83 kg/m²   Physical Exam  GEN: No acute distress,cooperative, well nourished, alert. HEENT: PEERLA, EOMI , normocephalic/atraumatic, external nose appears normal.  External ear is normal.    Neck: soft, supple, no appreciable thyromegaly,mass  CV: No upper extremity edema. Resp:  Breathing comfortably. Psych:normal affect. Neuro: AOx3  Other Pertinent Physical Exam findings: n/a        ASSESSMENT   Diagnosis Orders   1.  Essential hypertension  amLODIPine (NORVASC) 10 MG tablet    metoprolol tartrate (LOPRESSOR) 50 MG tablet    LIPID PANEL    COMPREHENSIVE METABOLIC PANEL   2. Diabetes mellitus type 2, insulin dependent (HCC)  metFORMIN (GLUCOPHAGE) 1000 MG tablet    insulin glargine (LANTUS SOLOSTAR) 100 UNIT/ML injection pen

## 2021-03-26 NOTE — TELEPHONE ENCOUNTER
Pt called in and states that the medication he is in need of a refill needs to be sent to a [facility] this time. Medication  Dulaglutide 0.75 MG/0.5ML SOPN [[id number]]  Dulaglutide 0.75 MG/0.5ML SOPN [[id number]]     Order Details  Dose: 0.75 mg Route: Subcutaneous Frequency: WEEKLY   Dispense Quantity: 4 pen Refills: 2          Sig: Inject 0.75 mg into the skin once a week         Start Date: 02/18/21 End Date: --   Written Date: 02/18/21 Expiration Date: 02/18/22     [facility] [address] [place] [address],[address] [place] [address] [place] [facility] [phone] [facility] [place] [phone]    Last OV: 2/18/2021  Labs: 10/7/2020    Please advise, thanks.

## 2021-03-31 ENCOUNTER — OFFICE VISIT (OUTPATIENT)
Dept: ENDOCRINOLOGY | Age: 56
End: 2021-03-31
Payer: COMMERCIAL

## 2021-03-31 VITALS
HEIGHT: 66 IN | BODY MASS INDEX: 30.41 KG/M2 | RESPIRATION RATE: 18 BRPM | HEART RATE: 57 BPM | DIASTOLIC BLOOD PRESSURE: 65 MMHG | WEIGHT: 189.2 LBS | SYSTOLIC BLOOD PRESSURE: 129 MMHG | OXYGEN SATURATION: 98 %

## 2021-03-31 DIAGNOSIS — I10 ESSENTIAL HYPERTENSION: ICD-10-CM

## 2021-03-31 LAB — HBA1C MFR BLD: 7.9 %

## 2021-03-31 PROCEDURE — 3017F COLORECTAL CA SCREEN DOC REV: CPT | Performed by: INTERNAL MEDICINE

## 2021-03-31 PROCEDURE — G8484 FLU IMMUNIZE NO ADMIN: HCPCS | Performed by: INTERNAL MEDICINE

## 2021-03-31 PROCEDURE — 1036F TOBACCO NON-USER: CPT | Performed by: INTERNAL MEDICINE

## 2021-03-31 PROCEDURE — 3046F HEMOGLOBIN A1C LEVEL >9.0%: CPT | Performed by: INTERNAL MEDICINE

## 2021-03-31 PROCEDURE — 83036 HEMOGLOBIN GLYCOSYLATED A1C: CPT | Performed by: INTERNAL MEDICINE

## 2021-03-31 PROCEDURE — 2022F DILAT RTA XM EVC RTNOPTHY: CPT | Performed by: INTERNAL MEDICINE

## 2021-03-31 PROCEDURE — G8427 DOCREV CUR MEDS BY ELIG CLIN: HCPCS | Performed by: INTERNAL MEDICINE

## 2021-03-31 PROCEDURE — G8417 CALC BMI ABV UP PARAM F/U: HCPCS | Performed by: INTERNAL MEDICINE

## 2021-03-31 PROCEDURE — 99214 OFFICE O/P EST MOD 30 MIN: CPT | Performed by: INTERNAL MEDICINE

## 2021-03-31 RX ORDER — FLASH GLUCOSE SCANNING READER
EACH MISCELLANEOUS
Qty: 1 EACH | Refills: 0 | Status: SHIPPED | OUTPATIENT
Start: 2021-03-31 | End: 2021-11-16 | Stop reason: ALTCHOICE

## 2021-03-31 RX ORDER — FLASH GLUCOSE SENSOR
KIT MISCELLANEOUS
Qty: 2 EACH | Refills: 2 | Status: SHIPPED | OUTPATIENT
Start: 2021-03-31 | End: 2021-11-16 | Stop reason: ALTCHOICE

## 2021-03-31 RX ORDER — DULAGLUTIDE 1.5 MG/.5ML
1.5 INJECTION, SOLUTION SUBCUTANEOUS WEEKLY
Qty: 4 PEN | Refills: 3 | Status: SHIPPED | OUTPATIENT
Start: 2021-03-31 | End: 2021-07-19 | Stop reason: SDUPTHER

## 2021-03-31 NOTE — PROGRESS NOTES
Seen as  patient for diabetes    Referred by Dr. Alex Larios    Interim:    Forgot log  Reports feels wobbly at time    Diagnosed with Type 2 diabetes mellitus in 2002    Known diabetic complications: Retinopathy, neuropathy  Uncontrolled, moderate    Current diabetic medications     Reports has had to change medication due to insurance    Lantus 80 units  Humalog SSI  14 units TID   2 for 50 > 150    Jardiance  Trulicity  Amaryl 4mg  Metformin 1000mg with supper    Last A1c    7.9%<--- 7.7%<----8.1%<-----8.7%<--- 9.2 <--- 8.1 <--- 8.4    Prior visit with dietician: Yes   Current diet: on average, 3 meals per day   Current exercise: Exercise  Current monitoring regimen: home blood tests - 2 times daily     Has brought blood glucose log/meter: yes   Home blood sugar records:   Any episodes of hypoglycemia? Worsened by high CHO    CABG in 2016  PCI 2002    Hyperlipidemia:   For   Years  Takes lipitor 80mg  Controlled  LDL 60 on 1/20    Hypertension for years  Stable now reports highs in the past  Takes norvasc 5mg Metoprolol 50mg BID ramipril 10mg HCTZ 25mg    Plasma MN  1.19    nl 24 hour urine level    Testosterone 488 Prolactin 12.3 TSH nl    Aldosterone 15.5 renin 6      Last eye exam: 9/19  Last foot exam: 9/20    He has microalbuminuria  Last microalbumin to creatinine ratio: 121 on 1/20    He has neuropathy  On neurontin 300mg   FH: Dad CAD   Mom CAD    PMH  1.DM  2. HLD  3.CAD    SH: No smoking    Past Surgical History:   Procedure Laterality Date    CORONARY ARTERY BYPASS GRAFT REDO  2016     Current Outpatient Medications   Medication Sig Dispense Refill    Dulaglutide 0.75 MG/0.5ML SOPN Inject 0.75 mg into the skin once a week 4 pen 2    amLODIPine (NORVASC) 10 MG tablet Take 1 tablet by mouth daily 90 tablet 3    metFORMIN (GLUCOPHAGE) 1000 MG tablet Take 1 tablet by mouth Daily with supper 90 tablet 3    atorvastatin (LIPITOR) 80 MG tablet Take 1 tablet by mouth daily 90 tablet 3    insulin glargine (LANTUS SOLOSTAR) 100 UNIT/ML injection pen INJECT SUBCUTANEOUSLY 80  UNITS AT NIGHT 60 mL 3    metoprolol tartrate (LOPRESSOR) 50 MG tablet Take 1 tablet by mouth 2 times daily 180 tablet 3    hydroCHLOROthiazide (HYDRODIURIL) 25 MG tablet Take 1 tablet by mouth daily 90 tablet 3    blood glucose test strips (ASCENSIA AUTODISC VI;ONE TOUCH ULTRA TEST VI) strip 1 each by In Vitro route 4 times daily (before meals and nightly) As needed. 100 each 3    nitroGLYCERIN (NITROSTAT) 0.4 MG SL tablet Place 1 tablet under the tongue every 5 minutes as needed for Chest pain 25 tablet 0    insulin lispro, 1 Unit Dial, (HUMALOG KWIKPEN) 100 UNIT/ML SOPN INJECT SUBCUTANEOUSLY 3  TIMES DAILY PER SLIDING  SCALE PLUS 12 UNITS MEAL  CORRECTION DOSAGE. MAXIMUM  60 UNITS DAILY 45 mL 3    gabapentin (NEURONTIN) 300 MG capsule Take 1 capsule by mouth nightly for 180 days. Intended supply: 30 days 90 capsule 3    glimepiride (AMARYL) 4 MG tablet Take 1 tablet by mouth nightly 90 tablet 3    ramipril (ALTACE) 10 MG capsule Take 1 capsule by mouth daily 90 capsule 3    empagliflozin (JARDIANCE) 10 MG tablet Take 1 tablet by mouth daily 90 tablet 1    Insulin Pen Needle (B-D UF III MINI PEN NEEDLES) 31G X 5 MM MISC Inject insulin 4 times a day. 1000 each 3    blood glucose monitor strips Test 2 times a day & as needed for symptoms of irregular blood glucose. 100 strip 0    glucose monitoring kit (FREESTYLE) monitoring kit 1 kit by Does not apply route 4 times daily (before meals and nightly) 1 kit 0    aspirin 325 MG tablet Take 325 mg by mouth      ibuprofen (ADVIL;MOTRIN) 800 MG tablet Take 200 mg by mouth as needed       ACCU-CHEK FASTCLIX LANCETS MISC Testing 4 x a day Dx: E11.42       No current facility-administered medications for this visit.         Review of Systems  Please see scanned document dated and signed      Objective:     Vitals:    03/31/21 0908   BP: 129/65   Pulse: 57   Resp: 18   SpO2: 98% MVT  8. Neuropathy: Not controlled, advised can try higher dose at night. 600mg. Will avoid TID given wobbly sensation. Could be due to neuropathy. Advised to see PCP also  9. CAD    Plan:      Lantus 80 units   Humalog 16 units AC TID   SSI 2 for 50 > 351    Trulicity   Jardiance   Advise to check blood sugar 4 times a day   Patient to send blood sugar log for titration. Advise to exercise regularly. Advise to low simple carbohydrate and protein with each  meal diet. Diabetes Care: recommend yearly eye exam, foot exam and urine microalbumin to   creatinine ratio. Patient is up-to-date.

## 2021-04-13 DIAGNOSIS — E11.42 DIABETIC POLYNEUROPATHY ASSOCIATED WITH TYPE 2 DIABETES MELLITUS (HCC): ICD-10-CM

## 2021-04-14 RX ORDER — GABAPENTIN 300 MG/1
CAPSULE ORAL
Qty: 30 CAPSULE | Refills: 4 | Status: SHIPPED | OUTPATIENT
Start: 2021-04-14 | End: 2021-07-19 | Stop reason: SDUPTHER

## 2021-06-29 RX ORDER — EMPAGLIFLOZIN 10 MG/1
1 TABLET, FILM COATED ORAL DAILY
Qty: 90 TABLET | Refills: 0 | Status: SHIPPED | OUTPATIENT
Start: 2021-06-29 | End: 2021-07-19

## 2021-07-14 RX ORDER — DULAGLUTIDE 0.75 MG/.5ML
INJECTION, SOLUTION SUBCUTANEOUS
Qty: 6 ML | Refills: 0 | Status: SHIPPED | OUTPATIENT
Start: 2021-07-14 | End: 2021-07-19

## 2021-07-19 ENCOUNTER — OFFICE VISIT (OUTPATIENT)
Dept: ENDOCRINOLOGY | Age: 56
End: 2021-07-19
Payer: COMMERCIAL

## 2021-07-19 VITALS
DIASTOLIC BLOOD PRESSURE: 75 MMHG | WEIGHT: 187.4 LBS | BODY MASS INDEX: 30.12 KG/M2 | HEIGHT: 66 IN | HEART RATE: 60 BPM | OXYGEN SATURATION: 97 % | SYSTOLIC BLOOD PRESSURE: 138 MMHG

## 2021-07-19 DIAGNOSIS — E11.42 DIABETIC POLYNEUROPATHY ASSOCIATED WITH TYPE 2 DIABETES MELLITUS (HCC): ICD-10-CM

## 2021-07-19 LAB — HBA1C MFR BLD: 8.1 %

## 2021-07-19 PROCEDURE — 3051F HG A1C>EQUAL 7.0%<8.0%: CPT | Performed by: INTERNAL MEDICINE

## 2021-07-19 PROCEDURE — 83036 HEMOGLOBIN GLYCOSYLATED A1C: CPT | Performed by: INTERNAL MEDICINE

## 2021-07-19 PROCEDURE — 2022F DILAT RTA XM EVC RTNOPTHY: CPT | Performed by: INTERNAL MEDICINE

## 2021-07-19 PROCEDURE — G8417 CALC BMI ABV UP PARAM F/U: HCPCS | Performed by: INTERNAL MEDICINE

## 2021-07-19 PROCEDURE — 3017F COLORECTAL CA SCREEN DOC REV: CPT | Performed by: INTERNAL MEDICINE

## 2021-07-19 PROCEDURE — 1036F TOBACCO NON-USER: CPT | Performed by: INTERNAL MEDICINE

## 2021-07-19 PROCEDURE — G8427 DOCREV CUR MEDS BY ELIG CLIN: HCPCS | Performed by: INTERNAL MEDICINE

## 2021-07-19 PROCEDURE — 99214 OFFICE O/P EST MOD 30 MIN: CPT | Performed by: INTERNAL MEDICINE

## 2021-07-19 RX ORDER — GABAPENTIN 300 MG/1
CAPSULE ORAL
Qty: 360 CAPSULE | Refills: 2 | Status: SHIPPED | OUTPATIENT
Start: 2021-07-19 | End: 2021-11-16 | Stop reason: SDUPTHER

## 2021-07-19 RX ORDER — DULAGLUTIDE 1.5 MG/.5ML
1.5 INJECTION, SOLUTION SUBCUTANEOUS WEEKLY
Qty: 12 PEN | Refills: 3 | Status: SHIPPED | OUTPATIENT
Start: 2021-07-19 | End: 2021-11-16 | Stop reason: SDUPTHER

## 2021-07-19 NOTE — PROGRESS NOTES
Seen as  patient for diabetes    Referred by Dr. Maxim Brady    Interim:    Forgot log  Reports feels wobbly at time  Thinks it is jardiance  He did try holding and got better    Diagnosed with Type 2 diabetes mellitus in 2002    Known diabetic complications: Retinopathy, neuropathy  Uncontrolled, moderate    Current diabetic medications     Reports has had to change medication due to insurance    Lantus 80 units  Humalog SSI  16 units TID   2 for 50 > 150    Jardiance  Trulicity 4.77  Amaryl 4mg  Metformin 1000mg with supper    Last A1c  8.1%<-----  7.9%<--- 7.7%<----8.1%<-----8.7%<--- 9.2 <--- 8.1 <--- 8.4    Prior visit with dietician: Yes   Current diet: on average, 3 meals per day   Current exercise: Exercise  Current monitoring regimen: home blood tests - 2 times daily     Has brought blood glucose log/meter: yes   Home blood sugar records:   Any episodes of hypoglycemia? Worsened by high CHO    CABG in 2016  PCI 2002    Hyperlipidemia:   For   Years  Takes lipitor 80mg  Controlled  LDL 60 on 1/20    Hypertension for years  Stable now reports highs in the past  Takes norvasc 10mg Metoprolol 50mg BID ramipril 10mg HCTZ 25mg    Plasma MN  1.19    nl 24 hour urine level    Testosterone 488 Prolactin 12.3 TSH nl    Aldosterone 15.5 renin 6      Last eye exam: 9/19  Last foot exam: 9/20    He has microalbuminuria  Last microalbumin to creatinine ratio: 121 on 1/20    He has neuropathy  On neurontin 300mg /300/600  FH: Dad CAD   Mom CAD    PMH  1.DM  2. HLD  3.CAD    SH: No smoking    Past Surgical History:   Procedure Laterality Date    CORONARY ARTERY BYPASS GRAFT REDO  2016     Current Outpatient Medications   Medication Sig Dispense Refill    TRULICITY 8.25 ZE/5.0ON SOPN INJECT THE CONTENTS OF ONE  PEN SUBCUTANEOUSLY WEEKLY  AS DIRECTED 6 mL 0    JARDIANCE 10 MG tablet TAKE 1 TABLET BY MOUTH  DAILY (Patient taking differently: Take 1 tablet by mouth daily 1/2) 90 tablet 0    Dulaglutide (TRULICITY) 1.5 MG/0.5ML SOPN Inject 1.5 mg into the skin once a week 4 pen 3    Continuous Blood Gluc  (FREESTYLE DILMA 2 READER) JAKE As needed 1 each 0    Continuous Blood Gluc Sensor (FREESTYLE DILMA 2 SENSOR) MISC Every 2 weeks 2 each 2    amLODIPine (NORVASC) 10 MG tablet Take 1 tablet by mouth daily 90 tablet 3    metFORMIN (GLUCOPHAGE) 1000 MG tablet Take 1 tablet by mouth Daily with supper 90 tablet 3    atorvastatin (LIPITOR) 80 MG tablet Take 1 tablet by mouth daily 90 tablet 3    insulin glargine (LANTUS SOLOSTAR) 100 UNIT/ML injection pen INJECT SUBCUTANEOUSLY 80  UNITS AT NIGHT 60 mL 3    metoprolol tartrate (LOPRESSOR) 50 MG tablet Take 1 tablet by mouth 2 times daily 180 tablet 3    hydroCHLOROthiazide (HYDRODIURIL) 25 MG tablet Take 1 tablet by mouth daily 90 tablet 3    blood glucose test strips (ASCENSIA AUTODISC VI;ONE TOUCH ULTRA TEST VI) strip 1 each by In Vitro route 4 times daily (before meals and nightly) As needed. 100 each 3    nitroGLYCERIN (NITROSTAT) 0.4 MG SL tablet Place 1 tablet under the tongue every 5 minutes as needed for Chest pain 25 tablet 0    insulin lispro, 1 Unit Dial, (HUMALOG KWIKPEN) 100 UNIT/ML SOPN INJECT SUBCUTANEOUSLY 3  TIMES DAILY PER SLIDING  SCALE PLUS 12 UNITS MEAL  CORRECTION DOSAGE. MAXIMUM  60 UNITS DAILY 45 mL 3    glimepiride (AMARYL) 4 MG tablet Take 1 tablet by mouth nightly 90 tablet 3    ramipril (ALTACE) 10 MG capsule Take 1 capsule by mouth daily 90 capsule 3    Insulin Pen Needle (B-D UF III MINI PEN NEEDLES) 31G X 5 MM MISC Inject insulin 4 times a day. 1000 each 3    blood glucose monitor strips Test 2 times a day & as needed for symptoms of irregular blood glucose.  100 strip 0    glucose monitoring kit (FREESTYLE) monitoring kit 1 kit by Does not apply route 4 times daily (before meals and nightly) 1 kit 0    aspirin 325 MG tablet Take 325 mg by mouth      ibuprofen (ADVIL;MOTRIN) 800 MG tablet Take 200 mg by mouth as needed       ACCU-CHEK FASTCLIX LANCETS MISC Testing 4 x a day Dx: E11.42      gabapentin (NEURONTIN) 300 MG capsule TAKE ONE CAPSULE BY MOUTH ONCE NIGHTLY 30 capsule 4     No current facility-administered medications for this visit. Review of Systems  Please see scanned document dated and signed      Objective:     Vitals:    07/19/21 0824   BP: (!) 162/81   Pulse: 60   SpO2: 97%        BP (!) 162/81   Pulse 60   Ht 5' 6\" (1.676 m)   Wt 187 lb 6.4 oz (85 kg)   SpO2 97%   BMI 30.25 kg/m²   Wt Readings from Last 3 Encounters:   07/19/21 187 lb 6.4 oz (85 kg)   03/31/21 189 lb 3.2 oz (85.8 kg)   02/18/21 184 lb 12.8 oz (83.8 kg)     Constitutional: Well-developed, appears stated age, cooperative, in no acute distress  H/E/N/M/T:atraumatic, normocephalic, external ears, nose, lips normal without lesions  Eyes: Lids, lashes, conjunctivae and sclerae normal, No proptosis, no redness  Neck: supple, symmetrical, no swelling  Skin: No obvious rashes or lesions present. Skin and hair texture normal  Psychiatric: Judgement and Insight:  judgement and insight appear normal  Neuro: Normal without focal findings, speech is normal normal, speech is spontaneous  Chest: No labored breathing, no chest deformity, no stridor  Musculoskeletal: No joint deformity, swelling          Lab Reviewed   No components found for: CHLPL  Lab Results   Component Value Date    TRIG 141 01/15/2020     Lab Results   Component Value Date    HDL 33 (L) 01/15/2020     Lab Results   Component Value Date    LDLCALC 60 01/15/2020     No results found for: LABVLDL  Lab Results   Component Value Date    LABA1C 7.9 03/31/2021       Assessment:     Omar Owens is a 54 y.o. male with :    1.T2DM : Fairly controlled, on insulin. Discussed goals, risk of complications . Advised diet changes. Started on trulicity and jardiance . A1c improved but now higher. Adjust humalog. Needs to bring log.  Will adjust trulicity dose as has helped  Check coverage for CGM, insurance does not cover. Advised to keep CHO constant  Hold jardiance to assess if cause for symptoms  2. HTN : Blood pressure at elevated, on multiple medications . May need additional medication. nl  Metanephrine,  aldosterone  3. HLD : LDL at goal  4. Microalbuminuria : On Ramipril. 5.Rash : Advised to see dermatologist  6. Leg swelling:  Less in the  morning, advised can try compression stockings, also see cardiologist if persists. 7.Fatigue: nl TSH, testosterone . Advised MVT  8. Neuropathy: Not controlled, advised can try higher dose at night. 600mg. Will avoid TID given wobbly sensation. Could be due to neuropathy. Advised to see PCP also  9. CAD    Plan:      Lantus 80 units   Humalog 16 units AC TID   SSI 2 for 50 > 745    Trulicity   Stop Jardiance   Advise to check blood sugar 4 times a day   Patient to send blood sugar log for titration. Advise to exercise regularly. Advise to low simple carbohydrate and protein with each  meal diet. Diabetes Care: recommend yearly eye exam, foot exam and urine microalbumin to   creatinine ratio. Patient is up-to-date.

## 2021-09-17 DIAGNOSIS — E11.9 DIABETES MELLITUS TYPE 2, INSULIN DEPENDENT (HCC): ICD-10-CM

## 2021-09-17 DIAGNOSIS — Z79.4 DIABETES MELLITUS TYPE 2, INSULIN DEPENDENT (HCC): ICD-10-CM

## 2021-09-20 RX ORDER — INSULIN GLARGINE 100 [IU]/ML
INJECTION, SOLUTION SUBCUTANEOUS
Qty: 75 ML | Refills: 3 | Status: SHIPPED | OUTPATIENT
Start: 2021-09-20 | End: 2021-11-16 | Stop reason: SDUPTHER

## 2021-09-20 NOTE — TELEPHONE ENCOUNTER
Requested Prescriptions     Pending Prescriptions Disp Refills    LANTUS SOLOSTAR 100 UNIT/ML injection pen [Pharmacy Med Name: Lantus SoloStar 100 UNIT/ML Subcutaneous Solution Pen-injector] 75 mL 3     Sig: INJECT SUBCUTANEOUSLY 80  UNITS AT NIGHT     Last ov 2/18/21  Last lab 2/18/21

## 2021-09-22 RX ORDER — EMPAGLIFLOZIN 10 MG/1
TABLET, FILM COATED ORAL
Qty: 90 TABLET | Refills: 3 | Status: SHIPPED | OUTPATIENT
Start: 2021-09-22 | End: 2021-11-16 | Stop reason: SDUPTHER

## 2021-09-22 NOTE — TELEPHONE ENCOUNTER
Requested Prescriptions     Pending Prescriptions Disp Refills    JARDIANCE 10 MG tablet [Pharmacy Med Name: Prachi Saunders  10MG  TAB] 90 tablet 3     Sig: TAKE 1 TABLET BY MOUTH  DAILY     Last ov 2/18/21  Last lab 2/18/21

## 2021-10-01 DIAGNOSIS — E11.9 DIABETES MELLITUS TYPE 2, INSULIN DEPENDENT (HCC): ICD-10-CM

## 2021-10-01 DIAGNOSIS — Z79.4 DIABETES MELLITUS TYPE 2, INSULIN DEPENDENT (HCC): ICD-10-CM

## 2021-10-04 RX ORDER — INSULIN LISPRO 100 [IU]/ML
INJECTION, SOLUTION INTRAVENOUS; SUBCUTANEOUS
Qty: 60 ML | Refills: 3 | Status: SHIPPED | OUTPATIENT
Start: 2021-10-04 | End: 2021-11-16 | Stop reason: SDUPTHER

## 2021-11-10 ENCOUNTER — TELEPHONE (OUTPATIENT)
Dept: FAMILY MEDICINE CLINIC | Age: 56
End: 2021-11-10

## 2021-11-11 NOTE — TELEPHONE ENCOUNTER
Find a 20-minute appointment. At this time I see 1 PM on this coming Monday, November 15. If that does not work then offer a 12 PM and on on Tuesday Wednesday or Thursday of next week.

## 2021-11-16 ENCOUNTER — OFFICE VISIT (OUTPATIENT)
Dept: FAMILY MEDICINE CLINIC | Age: 56
End: 2021-11-16
Payer: COMMERCIAL

## 2021-11-16 VITALS
HEART RATE: 63 BPM | WEIGHT: 192.8 LBS | DIASTOLIC BLOOD PRESSURE: 70 MMHG | OXYGEN SATURATION: 98 % | TEMPERATURE: 97.7 F | RESPIRATION RATE: 12 BRPM | BODY MASS INDEX: 31.12 KG/M2 | SYSTOLIC BLOOD PRESSURE: 126 MMHG

## 2021-11-16 DIAGNOSIS — E11.42 DIABETIC POLYNEUROPATHY ASSOCIATED WITH TYPE 2 DIABETES MELLITUS (HCC): ICD-10-CM

## 2021-11-16 DIAGNOSIS — Z23 NEED FOR INFLUENZA VACCINATION: ICD-10-CM

## 2021-11-16 DIAGNOSIS — R26.89 IMBALANCE: ICD-10-CM

## 2021-11-16 DIAGNOSIS — Z79.4 DIABETES MELLITUS TYPE 2, INSULIN DEPENDENT (HCC): ICD-10-CM

## 2021-11-16 DIAGNOSIS — E11.9 DIABETES MELLITUS TYPE 2, INSULIN DEPENDENT (HCC): ICD-10-CM

## 2021-11-16 DIAGNOSIS — I10 ESSENTIAL HYPERTENSION: ICD-10-CM

## 2021-11-16 DIAGNOSIS — Z01.818 PRE-OP EXAMINATION: Primary | ICD-10-CM

## 2021-11-16 PROCEDURE — G8417 CALC BMI ABV UP PARAM F/U: HCPCS | Performed by: FAMILY MEDICINE

## 2021-11-16 PROCEDURE — 2022F DILAT RTA XM EVC RTNOPTHY: CPT | Performed by: FAMILY MEDICINE

## 2021-11-16 PROCEDURE — G8427 DOCREV CUR MEDS BY ELIG CLIN: HCPCS | Performed by: FAMILY MEDICINE

## 2021-11-16 PROCEDURE — 90471 IMMUNIZATION ADMIN: CPT | Performed by: FAMILY MEDICINE

## 2021-11-16 PROCEDURE — G8482 FLU IMMUNIZE ORDER/ADMIN: HCPCS | Performed by: FAMILY MEDICINE

## 2021-11-16 PROCEDURE — 93000 ELECTROCARDIOGRAM COMPLETE: CPT | Performed by: FAMILY MEDICINE

## 2021-11-16 PROCEDURE — 99213 OFFICE O/P EST LOW 20 MIN: CPT | Performed by: FAMILY MEDICINE

## 2021-11-16 PROCEDURE — 90674 CCIIV4 VAC NO PRSV 0.5 ML IM: CPT | Performed by: FAMILY MEDICINE

## 2021-11-16 RX ORDER — METOPROLOL TARTRATE 50 MG/1
50 TABLET, FILM COATED ORAL 2 TIMES DAILY
Qty: 180 TABLET | Refills: 3 | Status: SHIPPED | OUTPATIENT
Start: 2021-11-16

## 2021-11-16 RX ORDER — GABAPENTIN 300 MG/1
CAPSULE ORAL
Qty: 360 CAPSULE | Refills: 2 | Status: SHIPPED | OUTPATIENT
Start: 2021-11-16 | End: 2022-11-16

## 2021-11-16 RX ORDER — GLIMEPIRIDE 4 MG/1
4 TABLET ORAL NIGHTLY
Qty: 90 TABLET | Refills: 3 | Status: SHIPPED | OUTPATIENT
Start: 2021-11-16 | End: 2022-05-24 | Stop reason: SDUPTHER

## 2021-11-16 RX ORDER — NITROGLYCERIN 0.4 MG/1
0.4 TABLET SUBLINGUAL EVERY 5 MIN PRN
Qty: 25 TABLET | Refills: 0 | Status: SHIPPED | OUTPATIENT
Start: 2021-11-16

## 2021-11-16 RX ORDER — GABAPENTIN 100 MG/1
100-200 CAPSULE ORAL 3 TIMES DAILY
Qty: 120 CAPSULE | Refills: 0 | Status: SHIPPED | OUTPATIENT
Start: 2021-11-16 | End: 2021-12-16

## 2021-11-16 RX ORDER — AMLODIPINE BESYLATE 10 MG/1
10 TABLET ORAL DAILY
Qty: 90 TABLET | Refills: 3 | Status: SHIPPED | OUTPATIENT
Start: 2021-11-16

## 2021-11-16 RX ORDER — IBUPROFEN 800 MG/1
800 TABLET ORAL PRN
Qty: 120 TABLET | Refills: 2 | Status: SHIPPED | OUTPATIENT
Start: 2021-11-16 | End: 2021-11-17 | Stop reason: SDUPTHER

## 2021-11-16 RX ORDER — INSULIN GLARGINE 100 [IU]/ML
INJECTION, SOLUTION SUBCUTANEOUS
Qty: 75 ML | Refills: 3 | Status: SHIPPED | OUTPATIENT
Start: 2021-11-16 | End: 2022-05-24 | Stop reason: SDUPTHER

## 2021-11-16 RX ORDER — EMPAGLIFLOZIN 10 MG/1
TABLET, FILM COATED ORAL
Qty: 90 TABLET | Refills: 3 | Status: SHIPPED | OUTPATIENT
Start: 2021-11-16 | End: 2022-05-24 | Stop reason: SDUPTHER

## 2021-11-16 RX ORDER — HYDROCHLOROTHIAZIDE 25 MG/1
25 TABLET ORAL DAILY
Qty: 90 TABLET | Refills: 3 | Status: SHIPPED | OUTPATIENT
Start: 2021-11-16

## 2021-11-16 RX ORDER — DULAGLUTIDE 1.5 MG/.5ML
1.5 INJECTION, SOLUTION SUBCUTANEOUS WEEKLY
Qty: 12 PEN | Refills: 3 | Status: SHIPPED | OUTPATIENT
Start: 2021-11-16 | End: 2022-05-24 | Stop reason: SDUPTHER

## 2021-11-16 RX ORDER — INSULIN LISPRO 100 [IU]/ML
INJECTION, SOLUTION INTRAVENOUS; SUBCUTANEOUS
Qty: 60 ML | Refills: 3 | Status: SHIPPED | OUTPATIENT
Start: 2021-11-16 | End: 2022-05-24 | Stop reason: SDUPTHER

## 2021-11-16 RX ORDER — ATORVASTATIN CALCIUM 80 MG/1
80 TABLET, FILM COATED ORAL DAILY
Qty: 90 TABLET | Refills: 3 | Status: SHIPPED | OUTPATIENT
Start: 2021-11-16

## 2021-11-16 RX ORDER — RAMIPRIL 10 MG/1
10 CAPSULE ORAL DAILY
Qty: 90 CAPSULE | Refills: 3 | Status: SHIPPED | OUTPATIENT
Start: 2021-11-16

## 2021-11-16 NOTE — PATIENT INSTRUCTIONS
VERIFY WITH [facility] IF A COVID SCREENING IS NEEDED    YOU DID YOUR FLU SHOT. ADJUST THE GABAPENTIN TO A LOWER TOTAL DAILY DOSE. CALL OR SEND Related Content Database (RCDb) MESSAGE IN A COUPLE OF WEEKS IF ANY IMPROVEMENT IN [place] AND RESOLUTION OF \"WOBBLINESS. \"

## 2021-11-16 NOTE — PROGRESS NOTES
Preoperative Consultation      Maryse Chau  YOB: 1965    Date of Service:  11/16/2021    Vitals:    11/16/21 1211   BP: 126/70   Pulse: 63   Resp: 12   Temp: 97.7 °F (36.5 °C)   TempSrc: Temporal   SpO2: 98%   Weight: 192 lb 12.8 oz (87.5 kg)      Wt Readings from Last 2 Encounters:   11/16/21 192 lb 12.8 oz (87.5 kg)   07/19/21 187 lb 6.4 oz (85 kg)     BP Readings from Last 3 Encounters:   11/16/21 126/70   07/19/21 138/75   03/31/21 129/65        Chief Complaint   Patient presents with    Pre-op Exam     right rotator cuff, and biceps tentomy and tenodesis, andree, 11/23/21     No Known Allergies  Outpatient Medications Marked as Taking for the 11/16/21 encounter (Office Visit) with Rosibel Castaneda,    Medication Sig Dispense Refill    ibuprofen (ADVIL;MOTRIN) 800 MG tablet Take 1 tablet by mouth as needed for Pain 120 tablet 2    insulin lispro, 1 Unit Dial, (HUMALOG KWIKPEN) 100 UNIT/ML SOPN INJECT SUBCUTANEOUSLY 3  TIMES DAILY PER SLIDING  SCALE PLUS 12 UNITS MEAL  CORRECTION DOSAGE.  MAXIMUM  60 UNITS DAILY 60 mL 3    empagliflozin (JARDIANCE) 10 MG tablet TAKE 1 TABLET BY MOUTH  DAILY 90 tablet 3    insulin glargine (LANTUS SOLOSTAR) 100 UNIT/ML injection pen INJECT SUBCUTANEOUSLY 80  UNITS AT NIGHT 75 mL 3    Dulaglutide (TRULICITY) 1.5 HI/6.7NS SOPN Inject 1.5 mg into the skin once a week 12 pen 3    gabapentin (NEURONTIN) 300 MG capsule Take one tab in the am and afternoon and 2 at night 360 capsule 2    amLODIPine (NORVASC) 10 MG tablet Take 1 tablet by mouth daily 90 tablet 3    metFORMIN (GLUCOPHAGE) 1000 MG tablet Take 1 tablet by mouth Daily with supper 90 tablet 3    atorvastatin (LIPITOR) 80 MG tablet Take 1 tablet by mouth daily 90 tablet 3    metoprolol tartrate (LOPRESSOR) 50 MG tablet Take 1 tablet by mouth 2 times daily 180 tablet 3    hydroCHLOROthiazide (HYDRODIURIL) 25 MG tablet Take 1 tablet by mouth daily 90 tablet 3    nitroGLYCERIN (NITROSTAT) 0.4 MG SL tablet Place 1 tablet under the tongue every 5 minutes as needed for Chest pain 25 tablet 0    blood glucose test strips (ASCENSIA AUTODISC VI;ONE TOUCH ULTRA TEST VI) strip 1 each by In Vitro route 4 times daily (before meals and nightly) As needed. 100 each 3    glimepiride (AMARYL) 4 MG tablet Take 1 tablet by mouth nightly 90 tablet 3    ramipril (ALTACE) 10 MG capsule Take 1 capsule by mouth daily 90 capsule 3    gabapentin (NEURONTIN) 100 MG capsule Take 1-2 capsules by mouth 3 times daily for 30 days. Intended supply: 90 days 120 capsule 0    Insulin Pen Needle (B-D UF III MINI PEN NEEDLES) 31G X 5 MM MISC Inject insulin 4 times a day. 1000 each 3    blood glucose monitor strips Test 2 times a day & as needed for symptoms of irregular blood glucose. 100 strip 0    glucose monitoring kit (FREESTYLE) monitoring kit 1 kit by Does not apply route 4 times daily (before meals and nightly) 1 kit 0    aspirin 325 MG tablet Take 325 mg by mouth      ACCU-CHEK FASTCLIX LANCETS MISC Testing 4 x a day Dx: E11.42         This patient presents to the office today for a preoperative consultation at the request of surgeon, Dr. Alexandro Navarro, who plans on performing right shoulder surgery on November 23 [facility]. The current problem began more than 6 months ago, and symptoms have been worsening with time. Conservative therapy: Yes: Physical therapy, which has been not very effective. .    Besides the preop check he does let me know that he is doing fine overall with exception of his balance and the sensation that he is \"wobbly. \". He wonders if he is experiencing side effects of any of his medication such as the gabapentin. He did experience a fall in the past 30 days landing on his right knee. Is currently on gabapentin 300 mg twice daily and still notices the wobbliness.       Planned anesthesia: General   Known anesthesia problems: None   Bleeding risk: No recent or remote history of abnormal bleeding  Personal or FH of DVT/PE: No    Patient objection to receiving blood products: No    There is no problem list on file for this patient. No past medical history on file. Past Surgical History:   Procedure Laterality Date    CORONARY ARTERY BYPASS GRAFT REDO  2016     Family History   Problem Relation Age of Onset    Heart Disease Mother     Heart Disease Father     Diabetes Father      Social History     Socioeconomic History    Marital status:      Spouse name: Not on file    Number of children: Not on file    Years of education: Not on file    Highest education level: Not on file   Occupational History    Not on file   Tobacco Use    Smoking status: Never Smoker    Smokeless tobacco: Never Used   Vaping Use    Vaping Use: Never used   Substance and Sexual Activity    Alcohol use: Not Currently    Drug use: Never    Sexual activity: Not on file   Other Topics Concern    Not on file   Social History Narrative    Not on file     Social Determinants of Health     Financial Resource Strain: Medium Risk    Difficulty of Paying Living Expenses: Somewhat hard   Food Insecurity: No Food Insecurity    Worried About Running Out of Food in the Last Year: Never true    Terrence of Food in the Last Year: Never true   Transportation Needs: No Transportation Needs    Lack of Transportation (Medical): No    Lack of Transportation (Non-Medical):  No   Physical Activity:     Days of Exercise per Week: Not on file    Minutes of Exercise per Session: Not on file   Stress:     Feeling of Stress : Not on file   Social Connections:     Frequency of Communication with Friends and Family: Not on file    Frequency of Social Gatherings with Friends and Family: Not on file    Attends Anabaptism Services: Not on file    Active Member of Clubs or Organizations: Not on file    Attends Club or Organization Meetings: Not on file    Marital Status: Not on file   Intimate Partner Violence:  Fear of Current or Ex-Partner: Not on file    Emotionally Abused: Not on file    Physically Abused: Not on file    Sexually Abused: Not on file   Housing Stability:     Unable to Pay for Housing in the Last Year: Not on file    Number of Places Lived in the Last Year: Not on file    Unstable Housing in the Last Year: Not on file       Review of Systems  A comprehensive review of systems was negative except for what was noted in the HPI. Physical Exam   Constitutional: He is oriented to person, place, and time. He appears well-developed and well-nourished. No distress. HENT:   Head: Normocephalic and atraumatic. Mouth/Throat: Uvula is midline, oropharynx is clear and moist and mucous membranes are normal.   Eyes: Conjunctivae and EOM are normal. Pupils are equal, round, and reactive to light. Neck: Trachea normal and normal range of motion. Neck supple. No JVD present. Carotid bruit is not present. No mass and no thyromegaly present. Cardiovascular: Normal rate, regular rhythm, normal heart sounds and intact distal pulses. Exam reveals no gallop and no friction rub. No murmur heard. Pulmonary/Chest: Effort normal and breath sounds normal. No respiratory distress. He has no wheezes. He has no rales. Abdominal: Soft. Normal aorta and bowel sounds are normal. He exhibits no distension and no mass. There is no hepatosplenomegaly. No tenderness. Musculoskeletal: He exhibits no edema and no tenderness. Neurological: He is alert and oriented to person, place, and time. He has normal strength. No cranial nerve deficit or sensory deficit. Coordination and gait normal.   Skin: Skin is warm and dry. No rash noted. No erythema. Psychiatric: He has a normal mood and affect. His behavior is normal.     EKG Interpretation:  normal EKG, normal sinus rhythm, unchanged from previous tracings.         Lab Results   Component Value Date    LABA1C 8.1 07/19/2021     Lab Results   Component Value Date  08/05/2020        Assessment:       64 y.o. patient with planned surgery as above. Known risk factors for perioperative complications: Diabetes mellitus  Current medications which may produce withdrawal symptoms if withheld perioperatively: none      Diagnosis Orders   1. Pre-op examination  EKG 12 Lead   2. Diabetes mellitus type 2, insulin dependent (HCC)  insulin lispro, 1 Unit Dial, (HUMALOG KWIKPEN) 100 UNIT/ML SOPN    insulin glargine (LANTUS SOLOSTAR) 100 UNIT/ML injection pen    metFORMIN (GLUCOPHAGE) 1000 MG tablet    blood glucose test strips (ASCENSIA AUTODISC VI;ONE TOUCH ULTRA TEST VI) strip   3. Diabetic polyneuropathy associated with type 2 diabetes mellitus (HCC)  gabapentin (NEURONTIN) 300 MG capsule   4. Essential hypertension  amLODIPine (NORVASC) 10 MG tablet    metoprolol tartrate (LOPRESSOR) 50 MG tablet   5. Need for influenza vaccination  INFLUENZA, MDCK QUADV, 2 YRS AND OLDER, IM, PF, PREFILL SYR OR SDV, 0.5ML (FLUCELVAX QUADV, PF)   6. Imbalance       #2-#3: continue present plan and close follow-up with endocrinology. #4: The current medical regimen is effective;  continue present plan and medications. #6:   Patient asked that the gabapentin 300 mg capsule scripts still be sent to the mail order but he would like to try a lower dose of gabapentin to see if that resolves the imbalance     Plan:     1. Preoperative workup as follows: Patient to ask if Covid screening is needed. 2. Change in medication regimen before surgery: Discontinue NSAIDs 7 days before surgery  3.  Prophylaxis for cardiac events with perioperative beta-blockers: Not indicated  ACC/AHA indications for pre-operative beta-blocker use:    · Vascular surgery with history of postitive stress test  · Intermediate or high risk surgery with history of CAD   · Intermediate or high risk surgery with multiple clinical predictors of CAD- 2 of the following: history of compensated or prior heart failure, history of cerebrovascular disease, DM, or renal insufficiency    Routine administration of higher-dose, long-acting metoprolol in beta-blocker-naïve patients on the day of surgery, and in the absence of dose titration is associated with an overall increase in mortality. Beta-blockers should be started days to weeks prior to surgery and titrated to pulse < 70.  4. Deep vein thrombosis prophylaxis: regimen to be chosen by surgical team  5. No contraindications to planned surgery.

## 2021-11-17 ENCOUNTER — TELEPHONE (OUTPATIENT)
Dept: FAMILY MEDICINE CLINIC | Age: 56
End: 2021-11-17

## 2021-11-17 DIAGNOSIS — M25.519 SHOULDER PAIN, UNSPECIFIED CHRONICITY, UNSPECIFIED LATERALITY: Primary | ICD-10-CM

## 2021-11-17 RX ORDER — IBUPROFEN 800 MG/1
800 TABLET ORAL EVERY 8 HOURS PRN
Qty: 120 TABLET | Refills: 2 | Status: SHIPPED | OUTPATIENT
Start: 2021-11-17

## 2021-11-17 NOTE — TELEPHONE ENCOUNTER
Belen with [employer] called requesting frequency; need more than PRN. Please advise. Thanks.          ibuprofen (ADVIL;MOTRIN) 800 MG tablet [[id number]]     Order Details  Dose: 800 mg Route: Oral Frequency: PRN for Pain  Dispense Quantity: 120 tablet Refills: 2        Sig: Take 1 tablet by mouth as needed for Pain       Start Date: 11/16/21 End Date: --  Written Date: 11/16/21 Expiration Date: 11/16/22  Original Order:  ibuprofen (ADVIL;MOTRIN) 800 MG tablet [[id number]]    Providers    Authorizing Provider: Louie Vázquez DO NPI: [id number]  Ordering User:  Louie Vázquez DO        Pharmacy    [address] [phone] Gregory Husain [phone]   [address] [place] [address]   Phone:  [phone]  Fax:  [phone]

## 2022-05-24 ENCOUNTER — OFFICE VISIT (OUTPATIENT)
Dept: ENDOCRINOLOGY | Age: 57
End: 2022-05-24
Payer: COMMERCIAL

## 2022-05-24 VITALS
SYSTOLIC BLOOD PRESSURE: 121 MMHG | BODY MASS INDEX: 29.03 KG/M2 | DIASTOLIC BLOOD PRESSURE: 64 MMHG | HEART RATE: 68 BPM | WEIGHT: 185 LBS | HEIGHT: 67 IN

## 2022-05-24 DIAGNOSIS — Z79.4 DIABETES MELLITUS TYPE 2, INSULIN DEPENDENT (HCC): ICD-10-CM

## 2022-05-24 DIAGNOSIS — E11.9 DIABETES MELLITUS TYPE 2, INSULIN DEPENDENT (HCC): ICD-10-CM

## 2022-05-24 LAB — HBA1C MFR BLD: 7.9 %

## 2022-05-24 PROCEDURE — G8417 CALC BMI ABV UP PARAM F/U: HCPCS | Performed by: INTERNAL MEDICINE

## 2022-05-24 PROCEDURE — 3017F COLORECTAL CA SCREEN DOC REV: CPT | Performed by: INTERNAL MEDICINE

## 2022-05-24 PROCEDURE — 83036 HEMOGLOBIN GLYCOSYLATED A1C: CPT | Performed by: INTERNAL MEDICINE

## 2022-05-24 PROCEDURE — G8427 DOCREV CUR MEDS BY ELIG CLIN: HCPCS | Performed by: INTERNAL MEDICINE

## 2022-05-24 PROCEDURE — 2022F DILAT RTA XM EVC RTNOPTHY: CPT | Performed by: INTERNAL MEDICINE

## 2022-05-24 PROCEDURE — 3051F HG A1C>EQUAL 7.0%<8.0%: CPT | Performed by: INTERNAL MEDICINE

## 2022-05-24 PROCEDURE — 99214 OFFICE O/P EST MOD 30 MIN: CPT | Performed by: INTERNAL MEDICINE

## 2022-05-24 PROCEDURE — 1036F TOBACCO NON-USER: CPT | Performed by: INTERNAL MEDICINE

## 2022-05-24 RX ORDER — DULAGLUTIDE 1.5 MG/.5ML
1.5 INJECTION, SOLUTION SUBCUTANEOUS WEEKLY
Qty: 12 PEN | Refills: 3 | Status: SHIPPED | OUTPATIENT
Start: 2022-05-24 | End: 2022-09-27

## 2022-05-24 RX ORDER — GLIMEPIRIDE 4 MG/1
4 TABLET ORAL NIGHTLY
Qty: 90 TABLET | Refills: 3 | Status: SHIPPED | OUTPATIENT
Start: 2022-05-24

## 2022-05-24 RX ORDER — PEN NEEDLE, DIABETIC 31 GX5/16"
NEEDLE, DISPOSABLE MISCELLANEOUS
Qty: 300 EACH | Refills: 3 | Status: SHIPPED | OUTPATIENT
Start: 2022-05-24

## 2022-05-24 RX ORDER — INSULIN LISPRO 100 [IU]/ML
INJECTION, SOLUTION INTRAVENOUS; SUBCUTANEOUS
Qty: 60 ML | Refills: 3 | Status: SHIPPED | OUTPATIENT
Start: 2022-05-24

## 2022-05-24 RX ORDER — INSULIN GLARGINE 100 [IU]/ML
INJECTION, SOLUTION SUBCUTANEOUS
Qty: 75 ML | Refills: 3 | Status: SHIPPED | OUTPATIENT
Start: 2022-05-24

## 2022-05-24 NOTE — PROGRESS NOTES
Seen as  patient for diabetes    Referred by Dr. Leatha Sher    Interim:  Seen after 7/21  Forgot log    Reports feels wobbly at time  Saw neurologist and ENT   was told may be due to neuropathy  Will see [facility]    Diagnosed with Type 2 diabetes mellitus in 2002    Known diabetic complications: Retinopathy, neuropathy  Uncontrolled, moderate    Current diabetic medications     Reports has had to change medication due to insurance    Lantus 80 units  Humalog SSI  14 units TID   2 for 50 > 150    Jardiance  Trulicity 1.5  Amaryl 4mg  Metformin 1000mg with supper    Last A1c  7.5%<----8.1%<-----  7.9%<--- 7.7%<----8.1%<-----8.7%<--- 9.2 <--- 8.1 <--- 8.4    Prior visit with dietician: Yes   Current diet: on average, 3 meals per day   Current exercise: Exercise  Current monitoring regimen: home blood tests - 2 times daily     Has brought blood glucose log/meter: yes   Home blood sugar records: 100-200  Any episodes of hypoglycemia? Worsened by high CHO    CABG in 2016  PCI 2002    Hyperlipidemia:   For   Years  Takes lipitor 80mg  Controlled  LDL 60 on 1/20    Hypertension for years  Stable now reports highs in the past  Takes norvasc 10mg Metoprolol 50mg BID ramipril 10mg HCTZ 25mg    Plasma MN  1.19    nl 24 hour urine level    Testosterone 488 Prolactin 12.3 TSH nl    Aldosterone 15.5 renin 6      Last eye exam: 9/19  Last foot exam: 9/20    He has microalbuminuria  Last microalbumin to creatinine ratio: 121 on 1/20    He has neuropathy  On neurontin 300mg /300/600  FH: Dad CAD   Mom CAD    PMH  1.DM  2. HLD  3.CAD    SH: No smoking    Past Surgical History:   Procedure Laterality Date    CORONARY ARTERY BYPASS GRAFT REDO  2016     Current Outpatient Medications   Medication Sig Dispense Refill    ibuprofen (ADVIL;MOTRIN) 800 MG tablet Take 1 tablet by mouth every 8 hours as needed for Pain 120 tablet 2    insulin lispro, 1 Unit Dial, (HUMALOG KWIKPEN) 100 UNIT/ML SOPN INJECT SUBCUTANEOUSLY 3  TIMES DAILY PER SLIDING  SCALE PLUS 12 UNITS MEAL  CORRECTION DOSAGE. MAXIMUM  60 UNITS DAILY 60 mL 3    empagliflozin (JARDIANCE) 10 MG tablet TAKE 1 TABLET BY MOUTH  DAILY 90 tablet 3    insulin glargine (LANTUS SOLOSTAR) 100 UNIT/ML injection pen INJECT SUBCUTANEOUSLY 80  UNITS AT NIGHT 75 mL 3    Dulaglutide (TRULICITY) 1.5 PD/7.3HU SOPN Inject 1.5 mg into the skin once a week 12 pen 3    gabapentin (NEURONTIN) 300 MG capsule Take one tab in the am and afternoon and 2 at night 360 capsule 2    amLODIPine (NORVASC) 10 MG tablet Take 1 tablet by mouth daily 90 tablet 3    metFORMIN (GLUCOPHAGE) 1000 MG tablet Take 1 tablet by mouth Daily with supper 90 tablet 3    atorvastatin (LIPITOR) 80 MG tablet Take 1 tablet by mouth daily 90 tablet 3    metoprolol tartrate (LOPRESSOR) 50 MG tablet Take 1 tablet by mouth 2 times daily 180 tablet 3    hydroCHLOROthiazide (HYDRODIURIL) 25 MG tablet Take 1 tablet by mouth daily 90 tablet 3    nitroGLYCERIN (NITROSTAT) 0.4 MG SL tablet Place 1 tablet under the tongue every 5 minutes as needed for Chest pain 25 tablet 0    blood glucose test strips (ASCENSIA AUTODISC VI;ONE TOUCH ULTRA TEST VI) strip 1 each by In Vitro route 4 times daily (before meals and nightly) As needed. 100 each 3    glimepiride (AMARYL) 4 MG tablet Take 1 tablet by mouth nightly 90 tablet 3    ramipril (ALTACE) 10 MG capsule Take 1 capsule by mouth daily 90 capsule 3    Insulin Pen Needle (B-D UF III MINI PEN NEEDLES) 31G X 5 MM MISC Inject insulin 4 times a day. 1000 each 3    blood glucose monitor strips Test 2 times a day & as needed for symptoms of irregular blood glucose.  100 strip 0    glucose monitoring kit (FREESTYLE) monitoring kit 1 kit by Does not apply route 4 times daily (before meals and nightly) 1 kit 0    aspirin 325 MG tablet Take 325 mg by mouth      ACCU-CHEK FASTCLIX LANCETS MISC Testing 4 x a day Dx: W68.39      gabapentin (NEURONTIN) 100 MG capsule Take 1-2 capsules by mouth 3 times daily for 30 days. Intended supply: 90 days 120 capsule 0     No current facility-administered medications for this visit. Review of Systems  Please see scanned document dated and signed      Objective:     Vitals:    05/24/22 1645   BP: 121/64   Pulse: 68        /64   Pulse 68   Ht 5' 7\" (1.702 m)   Wt 185 lb (83.9 kg)   BMI 28.98 kg/m²   Wt Readings from Last 3 Encounters:   05/24/22 185 lb (83.9 kg)   11/16/21 192 lb 12.8 oz (87.5 kg)   07/19/21 187 lb 6.4 oz (85 kg)     Constitutional: Well-developed, appears stated age, cooperative, in no acute distress  H/E/N/M/T:atraumatic, normocephalic, external ears, nose, lips normal without lesions  Eyes: Lids, lashes, conjunctivae and sclerae normal, No proptosis, no redness  Neck: supple, symmetrical, no swelling  Skin: No obvious rashes or lesions present. Skin and hair texture normal  Psychiatric: Judgement and Insight:  judgement and insight appear normal  Neuro: Normal without focal findings, speech is normal normal, speech is spontaneous  Chest: No labored breathing, no chest deformity, no stridor  Musculoskeletal: No joint deformity, swelling    Foot exam 5/22  No ulcer        Lab Reviewed   No components found for: CHLPL  Lab Results   Component Value Date    TRIG 141 01/15/2020     Lab Results   Component Value Date    HDL 33 (L) 01/15/2020     Lab Results   Component Value Date    LDLCALC 60 01/15/2020     No results found for: LABVLDL  Lab Results   Component Value Date    LABA1C 7.9 05/24/2022       Assessment:     Alejandra Martinez is a 64 y.o. male with :    1.T2DM : Fairly controlled, on insulin. Discussed goals, risk of complications . Advised diet changes. Started on trulicity and jardiance . A1c improved. Adjust humalog. Needs to bring log. Check coverage for CGM, insurance does not cover. Advised to keep CHO constant  2. HTN : Blood pressure at elevated, on multiple medications . May need additional medication.   nl Metanephrine,  aldosterone  3. HLD : LDL at goal  4. Microalbuminuria : On Ramipril. 5.Rash : Advised to see dermatologist  6. Leg swelling:  Less in the  morning, advised can try compression stockings, also see cardiologist if persists. 7.Fatigue: nl TSH, testosterone . Advised MVT  8. Neuropathy: On Neurontin. Could be due to neuropathy. Seen ENT and neurology, he was [facility]  Discussed importance of diabetes control  Advised B12  9. CAD    Plan:      Lantus 80 units   Humalog 16 units AC TID   SSI 2 for 50 > 691    Alayna Gannon   Advise to check blood sugar 4 times a day   Patient to send blood sugar log for titration. Advise to exercise regularly. Advise to low simple carbohydrate and protein with each  meal diet. Diabetes Care: recommend yearly eye exam, foot exam and urine microalbumin to   creatinine ratio. Patient is up-to-date.

## 2022-09-27 ENCOUNTER — OFFICE VISIT (OUTPATIENT)
Dept: ENDOCRINOLOGY | Age: 57
End: 2022-09-27
Payer: COMMERCIAL

## 2022-09-27 VITALS
WEIGHT: 188 LBS | SYSTOLIC BLOOD PRESSURE: 136 MMHG | BODY MASS INDEX: 29.51 KG/M2 | HEART RATE: 65 BPM | HEIGHT: 67 IN | DIASTOLIC BLOOD PRESSURE: 68 MMHG | OXYGEN SATURATION: 98 %

## 2022-09-27 LAB — HBA1C MFR BLD: 8 %

## 2022-09-27 PROCEDURE — 3017F COLORECTAL CA SCREEN DOC REV: CPT | Performed by: INTERNAL MEDICINE

## 2022-09-27 PROCEDURE — 2022F DILAT RTA XM EVC RTNOPTHY: CPT | Performed by: INTERNAL MEDICINE

## 2022-09-27 PROCEDURE — 1036F TOBACCO NON-USER: CPT | Performed by: INTERNAL MEDICINE

## 2022-09-27 PROCEDURE — G8427 DOCREV CUR MEDS BY ELIG CLIN: HCPCS | Performed by: INTERNAL MEDICINE

## 2022-09-27 PROCEDURE — 3052F HG A1C>EQUAL 8.0%<EQUAL 9.0%: CPT | Performed by: INTERNAL MEDICINE

## 2022-09-27 PROCEDURE — G8417 CALC BMI ABV UP PARAM F/U: HCPCS | Performed by: INTERNAL MEDICINE

## 2022-09-27 PROCEDURE — 99214 OFFICE O/P EST MOD 30 MIN: CPT | Performed by: INTERNAL MEDICINE

## 2022-09-27 PROCEDURE — 83036 HEMOGLOBIN GLYCOSYLATED A1C: CPT | Performed by: INTERNAL MEDICINE

## 2022-09-27 RX ORDER — DULAGLUTIDE 3 MG/.5ML
3 INJECTION, SOLUTION SUBCUTANEOUS WEEKLY
Qty: 13 ADJUSTABLE DOSE PRE-FILLED PEN SYRINGE | Refills: 3 | Status: SHIPPED | OUTPATIENT
Start: 2022-09-27

## 2022-09-27 NOTE — PROGRESS NOTES
Seen as  patient for diabetes    Referred by Dr. Andrews Gar    Interim:    Forgot meter  Leg swelling, LE  More during the day    Reports feels wobbly at time  Saw neurologist and ENT   was told may be due to neuropathy  Will see [facility]    Diagnosed with Type 2 diabetes mellitus in 2002    Known diabetic complications: Retinopathy, neuropathy  Uncontrolled, moderate    Current diabetic medications     Reports has had to change medication due to insurance    Lantus 80 units  Humalog SSI  16 units TID   2 for 50 > 150    Jardiance  Trulicity 1.5  Amaryl 4mg  Metformin 1000mg with supper    Last A1c  8%<-----7.5%<----8.1%<-----  7.9%<--- 7.7%<----8.1%<-----8.7%<--- 9.2 <--- 8.1 <--- 8.4    Prior visit with dietician: Yes   Current diet: on average, 3 meals per day   Current exercise: Exercise  Current monitoring regimen: home blood tests - 2 times daily     Has brought blood glucose log/meter: no  Home blood sugar records:   Any episodes of hypoglycemia? Worsened by high CHO    CABG in 2016  PCI 2002    Hyperlipidemia:   For   Years  Takes lipitor 80mg  Controlled  LDL 60 on 1/20    Hypertension for years  Stable now reports highs in the past  Takes norvasc 10mg Metoprolol 50mg BID ramipril 10mg HCTZ 25mg    Plasma MN  1.19    nl 24 hour urine level    Testosterone 488 Prolactin 12.3 TSH nl    Aldosterone 15.5 renin 6      Last eye exam: 9/19  due  Last foot exam: 9/22    He has microalbuminuria  Last microalbumin to creatinine ratio: 121 on 1/20    He has neuropathy  On neurontin 300mg /300/600  FH: Dad CAD   Mom CAD    PMH  1.DM  2. HLD  3.CAD    SH: No smoking    Past Surgical History:   Procedure Laterality Date    CORONARY ARTERY BYPASS GRAFT REDO  2016     Current Outpatient Medications   Medication Sig Dispense Refill    Dulaglutide (TRULICITY) 1.5 QO/4.5SY SOPN Inject 1.5 mg into the skin once a week 12 pen 3    metFORMIN (GLUCOPHAGE) 1000 MG tablet Take 1 tablet by mouth Daily with supper 90 tablet 3    glimepiride (AMARYL) 4 MG tablet Take 1 tablet by mouth nightly 90 tablet 3    Insulin Pen Needle (B-D UF III MINI PEN NEEDLES) 31G X 5 MM MISC Inject insulin 4 times a day. 300 each 3    insulin glargine (LANTUS SOLOSTAR) 100 UNIT/ML injection pen INJECT SUBCUTANEOUSLY 80  UNITS AT NIGHT 75 mL 3    empagliflozin (JARDIANCE) 10 MG tablet TAKE 1 TABLET BY MOUTH  DAILY 90 tablet 3    insulin lispro, 1 Unit Dial, (HUMALOG KWIKPEN) 100 UNIT/ML SOPN INJECT SUBCUTANEOUSLY 3  TIMES DAILY PER SLIDING  SCALE PLUS 18 UNITS MEAL  CORRECTION DOSAGE. MAXIMUM  60 UNITS DAILY 60 mL 3    ibuprofen (ADVIL;MOTRIN) 800 MG tablet Take 1 tablet by mouth every 8 hours as needed for Pain 120 tablet 2    gabapentin (NEURONTIN) 300 MG capsule Take one tab in the am and afternoon and 2 at night 360 capsule 2    amLODIPine (NORVASC) 10 MG tablet Take 1 tablet by mouth daily 90 tablet 3    atorvastatin (LIPITOR) 80 MG tablet Take 1 tablet by mouth daily 90 tablet 3    metoprolol tartrate (LOPRESSOR) 50 MG tablet Take 1 tablet by mouth 2 times daily 180 tablet 3    hydroCHLOROthiazide (HYDRODIURIL) 25 MG tablet Take 1 tablet by mouth daily 90 tablet 3    nitroGLYCERIN (NITROSTAT) 0.4 MG SL tablet Place 1 tablet under the tongue every 5 minutes as needed for Chest pain 25 tablet 0    blood glucose test strips (ASCENSIA AUTODISC VI;ONE TOUCH ULTRA TEST VI) strip 1 each by In Vitro route 4 times daily (before meals and nightly) As needed. 100 each 3    ramipril (ALTACE) 10 MG capsule Take 1 capsule by mouth daily 90 capsule 3    blood glucose monitor strips Test 2 times a day & as needed for symptoms of irregular blood glucose.  100 strip 0    glucose monitoring kit (FREESTYLE) monitoring kit 1 kit by Does not apply route 4 times daily (before meals and nightly) 1 kit 0    aspirin 325 MG tablet Take 325 mg by mouth      ACCU-CHEK FASTCLIX LANCETS MISC Testing 4 x a day Dx: N59.56      gabapentin (NEURONTIN) 100 MG capsule Take 1-2 capsules by mouth 3 times daily for 30 days. Intended supply: 90 days 120 capsule 0     No current facility-administered medications for this visit. Review of Systems  Please see scanned document dated and signed      Objective:     Vitals:    09/27/22 1642   BP: 136/68   Pulse: 65   SpO2: 98%        Ht 5' 7\" (1.702 m)   Wt 188 lb (85.3 kg)   BMI 29.44 kg/m²   Wt Readings from Last 3 Encounters:   09/27/22 188 lb (85.3 kg)   05/24/22 185 lb (83.9 kg)   11/16/21 192 lb 12.8 oz (87.5 kg)     Constitutional: Well-developed, appears stated age, cooperative, in no acute distress  H/E/N/M/T:atraumatic, normocephalic, external ears, nose, lips normal without lesions  Eyes: Lids, lashes, conjunctivae and sclerae normal, No proptosis, no redness  Neck: supple, symmetrical, no swelling  Skin: No obvious rashes or lesions present. Skin and hair texture normal  Psychiatric: Judgement and Insight:  judgement and insight appear normal  Neuro: Normal without focal findings, speech is normal normal, speech is spontaneous  Chest: No labored breathing, no chest deformity, no stridor  Musculoskeletal: No joint deformity, swelling    Foot exam 9/22  No ulcer decrease monofilament  Trace swelling      Lab Reviewed   No components found for: CHLPL  Lab Results   Component Value Date    TRIG 141 01/15/2020     Lab Results   Component Value Date    HDL 33 (L) 01/15/2020     Lab Results   Component Value Date    LDLCALC 60 01/15/2020     No results found for: LABVLDL  Lab Results   Component Value Date    LABA1C 7.9 05/24/2022       Assessment:     Xiomara Summers is a 62 y.o. male with :    1.T2DM : Fairly controlled, on insulin. Discussed goals, risk of complications . Advised diet changes. Started on trulicity and jardiance . A1c higher, needs to bring log. Adjust humalog. Increase trulicity  Check coverage for CGM, insurance does not cover. Advised to keep CHO constant  2. HTN : Blood pressure at elevated, on multiple medications . May need additional medication. nl  Metanephrine,  aldosterone  3. HLD : LDL at goal  4. Microalbuminuria : On Ramipril. 5.Rash : Advised to see dermatologist  6. Leg swelling:  Less in the  morning, advised can try compression stockings, also see cardiologist if persists. 7.Fatigue: nl TSH, testosterone . Advised MVT  8. Neuropathy: On Neurontin. Could be due to DM. Seen ENT and neurology, he was [facility] to [facility]  Discussed importance of diabetes control  Advised B12  9. CAD    Plan:      Lantus 80 units   Humalog 18 units AC TID   SSI 2 for 50 > 097    Trulicity  3.7---> 3mg    Jardiance   Advise to check blood sugar 4 times a day   Patient to send blood sugar log for titration. Advise to exercise regularly. Advise to low simple carbohydrate and protein with each  meal diet. Diabetes Care: recommend yearly eye exam, foot exam and urine microalbumin to   creatinine ratio. Patient is up-to-date.

## 2022-11-30 ENCOUNTER — OFFICE VISIT (OUTPATIENT)
Dept: FAMILY MEDICINE CLINIC | Age: 57
End: 2022-11-30
Payer: COMMERCIAL

## 2022-11-30 VITALS
SYSTOLIC BLOOD PRESSURE: 130 MMHG | HEIGHT: 67 IN | OXYGEN SATURATION: 97 % | HEART RATE: 68 BPM | BODY MASS INDEX: 29.32 KG/M2 | WEIGHT: 186.8 LBS | DIASTOLIC BLOOD PRESSURE: 72 MMHG

## 2022-11-30 DIAGNOSIS — Z12.11 COLON CANCER SCREENING: ICD-10-CM

## 2022-11-30 DIAGNOSIS — Z79.4 TYPE 2 DIABETES MELLITUS WITH RIGHT EYE AFFECTED BY MODERATE NONPROLIFERATIVE RETINOPATHY AND MACULAR EDEMA, WITH LONG-TERM CURRENT USE OF INSULIN (HCC): Primary | ICD-10-CM

## 2022-11-30 DIAGNOSIS — Z12.5 SCREENING PSA (PROSTATE SPECIFIC ANTIGEN): ICD-10-CM

## 2022-11-30 DIAGNOSIS — E11.3311 TYPE 2 DIABETES MELLITUS WITH RIGHT EYE AFFECTED BY MODERATE NONPROLIFERATIVE RETINOPATHY AND MACULAR EDEMA, WITH LONG-TERM CURRENT USE OF INSULIN (HCC): Primary | ICD-10-CM

## 2022-11-30 DIAGNOSIS — I10 ESSENTIAL HYPERTENSION: ICD-10-CM

## 2022-11-30 PROBLEM — H43.11 VITREOUS HEMORRHAGE OF RIGHT EYE (HCC): Status: ACTIVE | Noted: 2022-11-30

## 2022-11-30 PROBLEM — H25.13 AGE-RELATED NUCLEAR CATARACT OF BOTH EYES: Status: ACTIVE | Noted: 2022-11-30

## 2022-11-30 PROBLEM — R74.8 ELEVATED LIVER ENZYMES: Status: ACTIVE | Noted: 2018-10-10

## 2022-11-30 PROCEDURE — G8427 DOCREV CUR MEDS BY ELIG CLIN: HCPCS | Performed by: FAMILY MEDICINE

## 2022-11-30 PROCEDURE — G8484 FLU IMMUNIZE NO ADMIN: HCPCS | Performed by: FAMILY MEDICINE

## 2022-11-30 PROCEDURE — 1036F TOBACCO NON-USER: CPT | Performed by: FAMILY MEDICINE

## 2022-11-30 PROCEDURE — 3052F HG A1C>EQUAL 8.0%<EQUAL 9.0%: CPT | Performed by: FAMILY MEDICINE

## 2022-11-30 PROCEDURE — 99214 OFFICE O/P EST MOD 30 MIN: CPT | Performed by: FAMILY MEDICINE

## 2022-11-30 PROCEDURE — 3017F COLORECTAL CA SCREEN DOC REV: CPT | Performed by: FAMILY MEDICINE

## 2022-11-30 PROCEDURE — 3074F SYST BP LT 130 MM HG: CPT | Performed by: FAMILY MEDICINE

## 2022-11-30 PROCEDURE — 3078F DIAST BP <80 MM HG: CPT | Performed by: FAMILY MEDICINE

## 2022-11-30 PROCEDURE — G8417 CALC BMI ABV UP PARAM F/U: HCPCS | Performed by: FAMILY MEDICINE

## 2022-11-30 PROCEDURE — 2022F DILAT RTA XM EVC RTNOPTHY: CPT | Performed by: FAMILY MEDICINE

## 2022-11-30 RX ORDER — FUROSEMIDE 20 MG/1
20 TABLET ORAL DAILY
Qty: 30 TABLET | Refills: 0 | Status: SHIPPED | OUTPATIENT
Start: 2022-11-30

## 2022-11-30 RX ORDER — HYDROCHLOROTHIAZIDE 25 MG/1
25 TABLET ORAL DAILY
Qty: 90 TABLET | Refills: 3 | Status: SHIPPED | OUTPATIENT
Start: 2022-11-30

## 2022-11-30 RX ORDER — METOPROLOL TARTRATE 50 MG/1
50 TABLET, FILM COATED ORAL 2 TIMES DAILY
Qty: 180 TABLET | Refills: 3 | Status: SHIPPED | OUTPATIENT
Start: 2022-11-30

## 2022-11-30 RX ORDER — ATORVASTATIN CALCIUM 80 MG/1
80 TABLET, FILM COATED ORAL DAILY
Qty: 90 TABLET | Refills: 3 | Status: SHIPPED | OUTPATIENT
Start: 2022-11-30

## 2022-11-30 RX ORDER — FUROSEMIDE 20 MG/1
20 TABLET ORAL DAILY
Qty: 90 TABLET | Refills: 0 | Status: SHIPPED | OUTPATIENT
Start: 2022-11-30 | End: 2022-11-30

## 2022-11-30 RX ORDER — AMLODIPINE BESYLATE 10 MG/1
10 TABLET ORAL DAILY
Qty: 90 TABLET | Refills: 3 | Status: SHIPPED | OUTPATIENT
Start: 2022-11-30

## 2022-11-30 RX ORDER — RAMIPRIL 10 MG/1
10 CAPSULE ORAL DAILY
Qty: 90 CAPSULE | Refills: 3 | Status: SHIPPED | OUTPATIENT
Start: 2022-11-30

## 2022-11-30 RX ORDER — NITROGLYCERIN 0.4 MG/1
0.4 TABLET SUBLINGUAL EVERY 5 MIN PRN
Qty: 25 TABLET | Refills: 0 | Status: SHIPPED | OUTPATIENT
Start: 2022-11-30

## 2022-11-30 SDOH — ECONOMIC STABILITY: FOOD INSECURITY: WITHIN THE PAST 12 MONTHS, THE FOOD YOU BOUGHT JUST DIDN'T LAST AND YOU DIDN'T HAVE MONEY TO GET MORE.: NEVER TRUE

## 2022-11-30 SDOH — ECONOMIC STABILITY: FOOD INSECURITY: WITHIN THE PAST 12 MONTHS, YOU WORRIED THAT YOUR FOOD WOULD RUN OUT BEFORE YOU GOT MONEY TO BUY MORE.: NEVER TRUE

## 2022-11-30 ASSESSMENT — PATIENT HEALTH QUESTIONNAIRE - PHQ9
SUM OF ALL RESPONSES TO PHQ QUESTIONS 1-9: 0
1. LITTLE INTEREST OR PLEASURE IN DOING THINGS: 0
SUM OF ALL RESPONSES TO PHQ9 QUESTIONS 1 & 2: 0
2. FEELING DOWN, DEPRESSED OR HOPELESS: 0

## 2022-11-30 ASSESSMENT — SOCIAL DETERMINANTS OF HEALTH (SDOH): HOW HARD IS IT FOR YOU TO PAY FOR THE VERY BASICS LIKE FOOD, HOUSING, MEDICAL CARE, AND HEATING?: SOMEWHAT HARD

## 2022-11-30 NOTE — PROGRESS NOTES
Lakshmi Simon (:  1965) is a 62 y.o. male,Established patient, here for evaluation of the following chief complaint(s):  New Patient (Patient needs to establish with new PCP. History of HTN, type 2 diabetes and \"heart disease. \")         ASSESSMENT/PLAN:  Vaishnavi Canales was seen today for new patient. 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 Titrating up on trulicity  Screening PSA (prostate specific antigen)  -     PSA Screening; Future    Essential hypertension  -     amLODIPine (NORVASC) 10 MG tablet; Take 1 tablet by mouth daily  -     metoprolol tartrate (LOPRESSOR) 50 MG tablet; Take 1 tablet by mouth 2 times daily  Stable on amlodipine, hctz, and metoprolol  Colon cancer screening  -     [facility], Gastroenterology (ERCP & EUS), [facility]    Other orders  -     atorvastatin (LIPITOR) 80 MG tablet; Take 1 tablet by mouth daily  -     hydroCHLOROthiazide (HYDRODIURIL) 25 MG tablet; Take 1 tablet by mouth daily  -     ramipril (ALTACE) 10 MG capsule; Take 1 capsule by mouth daily  -     nitroGLYCERIN (NITROSTAT) 0.4 MG SL tablet; Place 1 tablet under the tongue every 5 minutes as needed for Chest pain  -     Discontinue: furosemide (LASIX) 20 MG tablet; Take 1 tablet by mouth daily  -     furosemide (LASIX) 20 MG tablet; Take 1 tablet by mouth daily       No follow-ups on file. Wants to hold on tdap      Subjective   SUBJECTIVE/OBJECTIVE:  HPI  Pt is a of 62 y.o. male comes in today with   Chief Complaint   Patient presents with    New Patient     Patient needs to establish with new PCP. History of HTN, type 2 diabetes and \"heart disease. \" 3 stents in 2002  Cabgx2 in 2016  Scheduled with cards next march   Due for stress then as well. Exercises regularly    Has struggled with balance. Attributed to neuropathy. Vitals:    11/30/22 1308   BP: 130/72   Site: Left Upper Arm   Position: Sitting   Cuff Size: Medium Adult   Pulse: 68   SpO2: 97%   Weight: 186 lb 12.8 oz (84.7 kg)   Height: 5' 7\" (1.702 m)      Past Medical History:Reviewed  Medications:Reviewed. No Known Allergies   Social hx:Reviewed. Social History     Tobacco Use   Smoking Status Never   Smokeless Tobacco Never       Review of Systems       Objective   Physical Exam  Constitutional:       Appearance: Normal appearance. Cardiovascular:      Rate and Rhythm: Normal rate and regular rhythm. Pulmonary:      Effort: Pulmonary effort is normal.      Breath sounds: Normal breath sounds. No wheezing or rales. Musculoskeletal:      Comments: 1 + bilateral pitting edema   Skin:     Capillary Refill: Capillary refill takes less than 2 seconds. Neurological:      General: No focal deficit present. Mental Status: He is alert. An electronic signature was used to authenticate this note.     --Karlie Mccall MD

## 2023-01-16 ENCOUNTER — OFFICE VISIT (OUTPATIENT)
Dept: ENDOCRINOLOGY | Age: 58
End: 2023-01-16
Payer: COMMERCIAL

## 2023-01-16 VITALS
DIASTOLIC BLOOD PRESSURE: 89 MMHG | BODY MASS INDEX: 29.66 KG/M2 | RESPIRATION RATE: 14 BRPM | TEMPERATURE: 98 F | HEIGHT: 67 IN | WEIGHT: 189 LBS | SYSTOLIC BLOOD PRESSURE: 134 MMHG | HEART RATE: 66 BPM

## 2023-01-16 DIAGNOSIS — E11.9 DIABETES MELLITUS TYPE 2, INSULIN DEPENDENT (HCC): Primary | ICD-10-CM

## 2023-01-16 DIAGNOSIS — Z79.4 DIABETES MELLITUS TYPE 2, INSULIN DEPENDENT (HCC): Primary | ICD-10-CM

## 2023-01-16 LAB — HBA1C MFR BLD: 7.6 %

## 2023-01-16 PROCEDURE — 2022F DILAT RTA XM EVC RTNOPTHY: CPT | Performed by: INTERNAL MEDICINE

## 2023-01-16 PROCEDURE — 99214 OFFICE O/P EST MOD 30 MIN: CPT | Performed by: INTERNAL MEDICINE

## 2023-01-16 PROCEDURE — G8484 FLU IMMUNIZE NO ADMIN: HCPCS | Performed by: INTERNAL MEDICINE

## 2023-01-16 PROCEDURE — 3075F SYST BP GE 130 - 139MM HG: CPT | Performed by: INTERNAL MEDICINE

## 2023-01-16 PROCEDURE — 3079F DIAST BP 80-89 MM HG: CPT | Performed by: INTERNAL MEDICINE

## 2023-01-16 PROCEDURE — 83036 HEMOGLOBIN GLYCOSYLATED A1C: CPT | Performed by: INTERNAL MEDICINE

## 2023-01-16 PROCEDURE — 3017F COLORECTAL CA SCREEN DOC REV: CPT | Performed by: INTERNAL MEDICINE

## 2023-01-16 PROCEDURE — G8427 DOCREV CUR MEDS BY ELIG CLIN: HCPCS | Performed by: INTERNAL MEDICINE

## 2023-01-16 PROCEDURE — 3051F HG A1C>EQUAL 7.0%<8.0%: CPT | Performed by: INTERNAL MEDICINE

## 2023-01-16 PROCEDURE — G8417 CALC BMI ABV UP PARAM F/U: HCPCS | Performed by: INTERNAL MEDICINE

## 2023-01-16 PROCEDURE — 1036F TOBACCO NON-USER: CPT | Performed by: INTERNAL MEDICINE

## 2023-01-16 RX ORDER — GLIMEPIRIDE 4 MG/1
4 TABLET ORAL NIGHTLY
Qty: 90 TABLET | Refills: 1 | Status: SHIPPED | OUTPATIENT
Start: 2023-01-16

## 2023-01-16 RX ORDER — PEN NEEDLE, DIABETIC 31 GX5/16"
NEEDLE, DISPOSABLE MISCELLANEOUS
Qty: 300 EACH | Refills: 3 | Status: SHIPPED | OUTPATIENT
Start: 2023-01-16

## 2023-01-16 RX ORDER — DULAGLUTIDE 3 MG/.5ML
3 INJECTION, SOLUTION SUBCUTANEOUS WEEKLY
Qty: 13 ADJUSTABLE DOSE PRE-FILLED PEN SYRINGE | Refills: 1 | Status: SHIPPED | OUTPATIENT
Start: 2023-01-16

## 2023-01-16 RX ORDER — FLASH GLUCOSE SENSOR
KIT MISCELLANEOUS
Qty: 6 EACH | Refills: 2 | Status: SHIPPED | OUTPATIENT
Start: 2023-01-16

## 2023-01-16 RX ORDER — INSULIN LISPRO 100 [IU]/ML
INJECTION, SOLUTION INTRAVENOUS; SUBCUTANEOUS
Qty: 60 ML | Refills: 1 | Status: SHIPPED | OUTPATIENT
Start: 2023-01-16

## 2023-01-16 RX ORDER — FLASH GLUCOSE SCANNING READER
EACH MISCELLANEOUS
Qty: 1 EACH | Refills: 0 | Status: SHIPPED | OUTPATIENT
Start: 2023-01-16

## 2023-01-16 RX ORDER — INSULIN GLARGINE 100 [IU]/ML
INJECTION, SOLUTION SUBCUTANEOUS
Qty: 75 ML | Refills: 1 | Status: SHIPPED | OUTPATIENT
Start: 2023-01-16

## 2023-01-16 NOTE — PROGRESS NOTES
Seen as  patient for diabetes    Referred by Dr. Rosy Stewart    Interim:    Will be starting the higher dose of trulicity    Reports feels wobbly at time  Saw neurologist and ENT   was told may be due to neuropathy  Will see [facility]    Diagnosed with Type 2 diabetes mellitus in 2002    Known diabetic complications: Retinopathy, neuropathy  Uncontrolled, moderate    Current diabetic medications     Reports has had to change medication due to insurance    Lantus 80 units  Humalog SSI  6 units TID   2 for 50 > 150    Jardiance  Trulicity 3  Amaryl 4mg  Metformin 1000mg with supper    Last A1c 7.6%<------  8%<-----7.5%<----8.1%<-----  7.9%<--- 7.7%<----8.1%<-----8.7%<--- 9.2 <--- 8.1 <--- 8.4    Prior visit with dietician: Yes   Current diet: on average, 3 meals per day   Current exercise: Exercise  Current monitoring regimen: home blood tests - 2 times daily     Has brought blood glucose log/meter: no  Home blood sugar records: 100s  Any episodes of hypoglycemia? Worsened by high CHO    CABG in 2016  PCI 2002    Hyperlipidemia:   For   Years  Takes lipitor 80mg  Controlled  LDL 60 on 1/20    Hypertension for years  Stable now reports highs in the past  Takes norvasc 10mg Metoprolol 50mg BID ramipril 10mg HCTZ 25mg    Plasma MN  1.19    nl 24 hour urine level    Testosterone 488 Prolactin 12.3 TSH nl    Aldosterone 15.5 renin 6      Last eye exam: 9/19  due  Last foot exam: 9/22    He has microalbuminuria  Last microalbumin to creatinine ratio: 1/21 on 1/20    He has neuropathy  On neurontin 300mg /300/600  FH: Dad CAD   Mom CAD    PMH  1.DM  2. HLD  3.CAD    SH: No smoking    Past Surgical History:   Procedure Laterality Date    CORONARY ARTERY BYPASS GRAFT REDO  2016     Current Outpatient Medications   Medication Sig Dispense Refill    amLODIPine (NORVASC) 10 MG tablet Take 1 tablet by mouth daily 90 tablet 3    atorvastatin (LIPITOR) 80 MG tablet Take 1 tablet by mouth daily 90 tablet 3    metoprolol tartrate (LOPRESSOR) 50 MG tablet Take 1 tablet by mouth 2 times daily 180 tablet 3    hydroCHLOROthiazide (HYDRODIURIL) 25 MG tablet Take 1 tablet by mouth daily 90 tablet 3    ramipril (ALTACE) 10 MG capsule Take 1 capsule by mouth daily 90 capsule 3    nitroGLYCERIN (NITROSTAT) 0.4 MG SL tablet Place 1 tablet under the tongue every 5 minutes as needed for Chest pain 25 tablet 0    furosemide (LASIX) 20 MG tablet Take 1 tablet by mouth daily 30 tablet 0    Dulaglutide (TRULICITY) 3 GP/8.0NV SOPN Inject 3 mg into the skin once a week 13 Adjustable Dose Pre-filled Pen Syringe 3    metFORMIN (GLUCOPHAGE) 1000 MG tablet Take 1 tablet by mouth Daily with supper 90 tablet 3    glimepiride (AMARYL) 4 MG tablet Take 1 tablet by mouth nightly 90 tablet 3    Insulin Pen Needle (B-D UF III MINI PEN NEEDLES) 31G X 5 MM MISC Inject insulin 4 times a day. 300 each 3    insulin glargine (LANTUS SOLOSTAR) 100 UNIT/ML injection pen INJECT SUBCUTANEOUSLY 80  UNITS AT NIGHT 75 mL 3    empagliflozin (JARDIANCE) 10 MG tablet TAKE 1 TABLET BY MOUTH  DAILY 90 tablet 3    insulin lispro, 1 Unit Dial, (HUMALOG KWIKPEN) 100 UNIT/ML SOPN INJECT SUBCUTANEOUSLY 3  TIMES DAILY PER SLIDING  SCALE PLUS 18 UNITS MEAL  CORRECTION DOSAGE. MAXIMUM  60 UNITS DAILY 60 mL 3    ibuprofen (ADVIL;MOTRIN) 800 MG tablet Take 1 tablet by mouth every 8 hours as needed for Pain 120 tablet 2    blood glucose test strips (ASCENSIA AUTODISC VI;ONE TOUCH ULTRA TEST VI) strip 1 each by In Vitro route 4 times daily (before meals and nightly) As needed. 100 each 3    blood glucose monitor strips Test 2 times a day & as needed for symptoms of irregular blood glucose.  100 strip 0    glucose monitoring kit (FREESTYLE) monitoring kit 1 kit by Does not apply route 4 times daily (before meals and nightly) 1 kit 0    aspirin 325 MG tablet Take 325 mg by mouth      ACCU-CHEK FASTCLIX LANCETS MISC Testing 4 x a day Dx: E11.42       No current facility-administered medications for this visit. Review of Systems  Please see scanned document dated and signed      Objective:     Vitals:    01/16/23 1627   BP: 134/89   Pulse: 66   Resp: 14   Temp: 98 °F (36.7 °C)        /89   Pulse 66   Temp 98 °F (36.7 °C)   Resp 14   Ht 5' 7\" (1.702 m)   Wt 189 lb (85.7 kg)   BMI 29.60 kg/m²   Wt Readings from Last 3 Encounters:   01/16/23 189 lb (85.7 kg)   11/30/22 186 lb 12.8 oz (84.7 kg)   09/27/22 188 lb (85.3 kg)     Constitutional: Well-developed, appears stated age, cooperative, in no acute distress  H/E/N/M/T:atraumatic, normocephalic, external ears, nose, lips normal without lesions  Eyes: Lids, lashes, conjunctivae and sclerae normal, No proptosis, no redness  Neck: supple, symmetrical, no swelling  Skin: No obvious rashes or lesions present. Skin and hair texture normal  Psychiatric: Judgement and Insight:  judgement and insight appear normal  Neuro: Normal without focal findings, speech is normal normal, speech is spontaneous  Chest: No labored breathing, no chest deformity, no stridor  Musculoskeletal: No joint deformity, swelling    Foot exam 9/22  No ulcer decrease monofilament  Trace swelling      Lab Reviewed   No components found for: CHLPL  Lab Results   Component Value Date    TRIG 141 01/15/2020     Lab Results   Component Value Date    HDL 33 (L) 01/15/2020     Lab Results   Component Value Date    LDLCALC 60 01/15/2020     No results found for: LABVLDL  Lab Results   Component Value Date    LABA1C 8.0 09/27/2022       Assessment:     Lynne Paige is a 62 y.o. male with :    1.T2DM : Fairly controlled, on insulin. Discussed goals, risk of complications . Advised diet changes. Started on trulicity and jardiance . A1c improved. He will start higher dose of trulicity. Check coverage for CGM, insurance does not cover. He would like to check again,Advised to keep CHO constant  2. HTN : Blood pressure at elevated, on multiple medications . May need additional medication.   nl Metanephrine,  aldosterone  3. HLD : LDL at goal  4. Microalbuminuria : On Ramipril. 5.Rash : Advised to see dermatologist  6. Leg swelling:  Less in the  morning, advised can try compression stockings, also see cardiologist if persists. 7.Fatigue: nl TSH, testosterone . Advised MVT  8. Neuropathy: On Neurontin. Could be due to DM. Seen ENT and neurology, he was referred to [facility]  Discussed importance of diabetes control  Advised B12  9. CAD    Plan:      Lantus 80 units   Humalog 16 units AC TID   SSI 2 for 50 > 530    Trulicity 3mg    Jardiance   Advise to check blood sugar 4 times a day   Patient to send blood sugar log for titration. Advise to exercise regularly. Advise to low simple carbohydrate and protein with each  meal diet. Diabetes Care: recommend yearly eye exam, foot exam and urine microalbumin to   creatinine ratio. Patient is up-to-date.

## 2023-02-28 RX ORDER — FUROSEMIDE 20 MG/1
20 TABLET ORAL DAILY
Qty: 90 TABLET | Refills: 3 | Status: SHIPPED | OUTPATIENT
Start: 2023-02-28

## 2023-02-28 NOTE — TELEPHONE ENCOUNTER
Medication:   Requested Prescriptions     Pending Prescriptions Disp Refills    furosemide (LASIX) 20 MG tablet [Pharmacy Med Name: Furosemide 20 MG Oral Tablet] 90 tablet 3     Sig: TAKE 1 TABLET BY MOUTH DAILY        Last Filled: 11/30/2022   Last appt: 11/30/2022   Next appt: 5/31/2023

## 2023-04-26 ENCOUNTER — TELEPHONE (OUTPATIENT)
Dept: ENDOCRINOLOGY | Age: 58
End: 2023-04-26

## 2023-04-26 NOTE — TELEPHONE ENCOUNTER
Please fax all lab orders required to patient to [phone]    Patient is going first thing in morning @ [place] to get Labs

## 2023-04-27 LAB
ALBUMIN SERPL-MCNC: 4.2 G/DL (ref 3.5–5.7)
ALP BLD-CCNC: 66 IU/L (ref 35–135)
ALT SERPL-CCNC: 37 IU/L (ref 10–60)
ANION GAP SERPL CALCULATED.3IONS-SCNC: 6 MMOL/L (ref 4–16)
AST SERPL-CCNC: 27 IU/L (ref 10–40)
BILIRUB SERPL-MCNC: 0.6 MG/DL (ref 0–1.2)
BUN BLDV-MCNC: 18 MG/DL (ref 8–26)
CALCIUM SERPL-MCNC: 9.5 MG/DL (ref 8.5–10.4)
CHLORIDE BLD-SCNC: 102 MEQ/L (ref 98–111)
CHOLESTEROL, TOTAL: 103 MG/DL
CO2: 28 MMOL/L (ref 21–31)
CREAT SERPL-MCNC: 1.19 MG/DL (ref 0.7–1.3)
CREATININE URINE: 102.6 MG/DL
CREATININE URINE: 116.8 MG/DL
EGFR (CKD-EPI): 71 ML/MIN/1.73 M2
ESTIMATED AVERAGE GLUCOSE: 174 MG/DL
GLUCOSE BLD-MCNC: 155 MG/DL (ref 70–99)
HBA1C MFR BLD: 7.7 % (ref 4.2–5.6)
HDLC SERPL-MCNC: 31 MG/DL
LDL CHOLESTEROL CALCULATED: 39 MG/DL
MICROALBUMIN UR-MCNC: 12.1 MG/L
MICROALBUMIN/CREAT UR-RTO: 10 MG/G (ref 0–30)
NONHDLC SERPL-MCNC: 72 MG/DL
POTASSIUM SERPL-SCNC: 4.1 MEQ/L (ref 3.6–5.1)
PROSTATE SPECIFIC ANTIGEN: 0.22 NG/ML
PROTEIN, URINE, RANDOM: 8.6 MG/DL (ref 0–9.9)
PROTEIN/CREAT RATIO URINE RAN: 0.08 RATIO
SODIUM BLD-SCNC: 136 MEQ/L (ref 135–145)
TOTAL PROTEIN: 7.3 G/DL (ref 6–8)
TRIGL SERPL-MCNC: 166 MG/DL

## 2023-05-01 ENCOUNTER — OFFICE VISIT (OUTPATIENT)
Dept: ENDOCRINOLOGY | Age: 58
End: 2023-05-01
Payer: COMMERCIAL

## 2023-05-01 VITALS
OXYGEN SATURATION: 98 % | RESPIRATION RATE: 14 BRPM | TEMPERATURE: 97 F | DIASTOLIC BLOOD PRESSURE: 79 MMHG | BODY MASS INDEX: 29.54 KG/M2 | WEIGHT: 188.2 LBS | SYSTOLIC BLOOD PRESSURE: 130 MMHG | HEIGHT: 67 IN | HEART RATE: 68 BPM

## 2023-05-01 DIAGNOSIS — E11.9 DIABETES MELLITUS TYPE 2, INSULIN DEPENDENT (HCC): ICD-10-CM

## 2023-05-01 DIAGNOSIS — Z79.4 DIABETES MELLITUS TYPE 2, INSULIN DEPENDENT (HCC): ICD-10-CM

## 2023-05-01 PROCEDURE — 3078F DIAST BP <80 MM HG: CPT | Performed by: INTERNAL MEDICINE

## 2023-05-01 PROCEDURE — 99214 OFFICE O/P EST MOD 30 MIN: CPT | Performed by: INTERNAL MEDICINE

## 2023-05-01 PROCEDURE — 3051F HG A1C>EQUAL 7.0%<8.0%: CPT | Performed by: INTERNAL MEDICINE

## 2023-05-01 PROCEDURE — 3017F COLORECTAL CA SCREEN DOC REV: CPT | Performed by: INTERNAL MEDICINE

## 2023-05-01 PROCEDURE — 2022F DILAT RTA XM EVC RTNOPTHY: CPT | Performed by: INTERNAL MEDICINE

## 2023-05-01 PROCEDURE — G8427 DOCREV CUR MEDS BY ELIG CLIN: HCPCS | Performed by: INTERNAL MEDICINE

## 2023-05-01 PROCEDURE — 3075F SYST BP GE 130 - 139MM HG: CPT | Performed by: INTERNAL MEDICINE

## 2023-05-01 PROCEDURE — G8417 CALC BMI ABV UP PARAM F/U: HCPCS | Performed by: INTERNAL MEDICINE

## 2023-05-01 PROCEDURE — 1036F TOBACCO NON-USER: CPT | Performed by: INTERNAL MEDICINE

## 2023-05-01 RX ORDER — DULAGLUTIDE 3 MG/.5ML
INJECTION, SOLUTION SUBCUTANEOUS
Qty: 13 ADJUSTABLE DOSE PRE-FILLED PEN SYRINGE | Refills: 1 | Status: SHIPPED | OUTPATIENT
Start: 2023-05-01

## 2023-05-01 RX ORDER — INSULIN LISPRO 100 [IU]/ML
INJECTION, SOLUTION INTRAVENOUS; SUBCUTANEOUS
Qty: 60 ML | Refills: 1 | Status: SHIPPED | OUTPATIENT
Start: 2023-05-01

## 2023-05-01 RX ORDER — GLIMEPIRIDE 4 MG/1
4 TABLET ORAL NIGHTLY
Qty: 90 TABLET | Refills: 1 | Status: SHIPPED | OUTPATIENT
Start: 2023-05-01

## 2023-05-01 RX ORDER — INSULIN GLARGINE 100 [IU]/ML
INJECTION, SOLUTION SUBCUTANEOUS
Qty: 75 ML | Refills: 1 | Status: SHIPPED | OUTPATIENT
Start: 2023-05-01

## 2023-05-01 NOTE — PROGRESS NOTES
Seen as  patient for diabetes    Referred by Dr. Ernie Casarez    Interim:    Will be starting the higher dose of trulicity  Still got lower dose  CGM pending, was told needs PA    Reports feels wobbly at time  Saw neurologist and ENT   was told may be due to neuropathy  Will see [facility]    Diagnosed with Type 2 diabetes mellitus in 2002    Known diabetic complications: Retinopathy, neuropathy  Uncontrolled, moderate    Current diabetic medications     Reports has had to change medication due to insurance    Lantus 80 units  Humalog SSI  16 units TID   2 for 50 > 150    Jardiance  Trulicity 3  Amaryl 4mg  Metformin 1000mg with supper    Last A1c 7.7%<----7.6%<------  8%<-----7.5%<----8.1%<-----  7.9%<--- 7.7%<----8.1%<-----8.7%<--- 9.2 <--- 8.1 <--- 8.4    Prior visit with dietician: Yes   Current diet: on average, 3 meals per day   Current exercise: Exercise  Current monitoring regimen: home blood tests - 2 times daily     Has brought blood glucose log/meter: no  Home blood sugar records:  Any episodes of hypoglycemia? Worsened by high CHO    CABG in 2016  PCI 2002    Hyperlipidemia:   For   Years  Takes lipitor 80mg  Controlled  LDL 60 on 1/20  LDL 39 on 4/23    Hypertension for years  Stable now reports highs in the past  Takes norvasc 10mg Metoprolol 50mg BID ramipril 10mg HCTZ 25mg    Plasma MN  1.19    nl 24 hour urine level    Testosterone 488 Prolactin 12.3 TSH nl    Aldosterone 15.5 renin 6      Last eye exam: 9/19  due  Last foot exam: 9/22    He has microalbuminuria  Last microalbumin to creatinine ratio: 1/21 on 1/20---> 4/23    He has neuropathy  On neurontin 300mg /300/600  FH: Dad CAD   Mom CAD    PMH  1.DM  2. HLD  3.CAD    SH: No smoking    Past Surgical History:   Procedure Laterality Date    CORONARY ARTERY BYPASS GRAFT REDO  2016     Current Outpatient Medications   Medication Sig Dispense Refill    furosemide (LASIX) 20 MG tablet TAKE 1 TABLET BY MOUTH DAILY 90 tablet 3    Dulaglutide

## 2023-05-31 ENCOUNTER — OFFICE VISIT (OUTPATIENT)
Dept: FAMILY MEDICINE CLINIC | Age: 58
End: 2023-05-31
Payer: COMMERCIAL

## 2023-05-31 VITALS
BODY MASS INDEX: 28.97 KG/M2 | HEART RATE: 74 BPM | HEIGHT: 67 IN | SYSTOLIC BLOOD PRESSURE: 130 MMHG | DIASTOLIC BLOOD PRESSURE: 78 MMHG | OXYGEN SATURATION: 98 % | WEIGHT: 184.6 LBS

## 2023-05-31 DIAGNOSIS — Z79.4 TYPE 2 DIABETES MELLITUS WITH RIGHT EYE AFFECTED BY MODERATE NONPROLIFERATIVE RETINOPATHY AND MACULAR EDEMA, WITH LONG-TERM CURRENT USE OF INSULIN (HCC): Primary | ICD-10-CM

## 2023-05-31 DIAGNOSIS — I10 BENIGN ESSENTIAL HYPERTENSION: ICD-10-CM

## 2023-05-31 DIAGNOSIS — E11.3311 TYPE 2 DIABETES MELLITUS WITH RIGHT EYE AFFECTED BY MODERATE NONPROLIFERATIVE RETINOPATHY AND MACULAR EDEMA, WITH LONG-TERM CURRENT USE OF INSULIN (HCC): Primary | ICD-10-CM

## 2023-05-31 PROCEDURE — 3017F COLORECTAL CA SCREEN DOC REV: CPT | Performed by: FAMILY MEDICINE

## 2023-05-31 PROCEDURE — 99214 OFFICE O/P EST MOD 30 MIN: CPT | Performed by: FAMILY MEDICINE

## 2023-05-31 PROCEDURE — 2022F DILAT RTA XM EVC RTNOPTHY: CPT | Performed by: FAMILY MEDICINE

## 2023-05-31 PROCEDURE — G8427 DOCREV CUR MEDS BY ELIG CLIN: HCPCS | Performed by: FAMILY MEDICINE

## 2023-05-31 PROCEDURE — 3078F DIAST BP <80 MM HG: CPT | Performed by: FAMILY MEDICINE

## 2023-05-31 PROCEDURE — G8417 CALC BMI ABV UP PARAM F/U: HCPCS | Performed by: FAMILY MEDICINE

## 2023-05-31 PROCEDURE — 90750 HZV VACC RECOMBINANT IM: CPT | Performed by: FAMILY MEDICINE

## 2023-05-31 PROCEDURE — 90471 IMMUNIZATION ADMIN: CPT | Performed by: FAMILY MEDICINE

## 2023-05-31 PROCEDURE — 3051F HG A1C>EQUAL 7.0%<8.0%: CPT | Performed by: FAMILY MEDICINE

## 2023-05-31 PROCEDURE — 3075F SYST BP GE 130 - 139MM HG: CPT | Performed by: FAMILY MEDICINE

## 2023-05-31 PROCEDURE — 1036F TOBACCO NON-USER: CPT | Performed by: FAMILY MEDICINE

## 2023-05-31 SDOH — ECONOMIC STABILITY: FOOD INSECURITY: WITHIN THE PAST 12 MONTHS, THE FOOD YOU BOUGHT JUST DIDN'T LAST AND YOU DIDN'T HAVE MONEY TO GET MORE.: NEVER TRUE

## 2023-05-31 SDOH — ECONOMIC STABILITY: HOUSING INSECURITY
IN THE LAST 12 MONTHS, WAS THERE A TIME WHEN YOU DID NOT HAVE A STEADY PLACE TO SLEEP OR SLEPT IN A SHELTER (INCLUDING NOW)?: NO

## 2023-05-31 SDOH — ECONOMIC STABILITY: FOOD INSECURITY: WITHIN THE PAST 12 MONTHS, YOU WORRIED THAT YOUR FOOD WOULD RUN OUT BEFORE YOU GOT MONEY TO BUY MORE.: NEVER TRUE

## 2023-05-31 SDOH — ECONOMIC STABILITY: INCOME INSECURITY: HOW HARD IS IT FOR YOU TO PAY FOR THE VERY BASICS LIKE FOOD, HOUSING, MEDICAL CARE, AND HEATING?: NOT HARD AT ALL

## 2023-05-31 ASSESSMENT — PATIENT HEALTH QUESTIONNAIRE - PHQ9
SUM OF ALL RESPONSES TO PHQ QUESTIONS 1-9: 0
SUM OF ALL RESPONSES TO PHQ9 QUESTIONS 1 & 2: 0
1. LITTLE INTEREST OR PLEASURE IN DOING THINGS: 0
2. FEELING DOWN, DEPRESSED OR HOPELESS: 0
SUM OF ALL RESPONSES TO PHQ QUESTIONS 1-9: 0

## 2023-05-31 NOTE — PROGRESS NOTES
Heber Alatorre (:  1965) is a 62 y.o. male,Established patient, here for evaluation of the following chief complaint(s):  6 Month Follow-Up (Patient is now established with endo for DM. Labs completed 23. Denies concerns. )         ASSESSMENT/PLAN:  Quita Shearer was seen today for 6 month follow-up. 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 The current medical regimen is effective;  continue present plan and medications. follow up with endo as scheduled  Benign essential hypertension  well controlled on amlodipine, ramipril, hctz  Other orders  -     Zoster, SHINGRIX, (18 yrs +), IM         No follow-ups on file. States pcv done in the past few years  Shingrix today  Will schedule colonoscopy. Subjective   SUBJECTIVE/OBJECTIVE:  HPI  Pt is a of 62 y.o. male comes in today with   Chief Complaint   Patient presents with    6 Month Follow-Up     Patient is now established with endo for DM. Labs completed 23. Denies concerns. On trulicity, jardiance, amaryl, metformin, lantus 80 and humalog SSI  Just increased to 3 mg trulicity 3 months ago. No sig hypo or hyperglycemia  Diet has been good. Vitals:    23 0755   BP: 130/78   Site: Left Upper Arm   Position: Sitting   Cuff Size: Small Adult   Pulse: 74   SpO2: 98%   Weight: 184 lb 9.6 oz (83.7 kg)   Height: 5' 7\" (1.702 m)       Past Medical History:Reviewed  Medications:Reviewed. No Known Allergies   Social hx:Reviewed. Social History     Tobacco Use   Smoking Status Never   Smokeless Tobacco Never        Review of Systems       Objective   Physical Exam  Constitutional:       Appearance: Normal appearance. Cardiovascular:      Rate and Rhythm: Normal rate and regular rhythm. Heart sounds: No murmur heard.   Pulmonary:      Effort:

## 2023-06-14 NOTE — TELEPHONE ENCOUNTER
Patient advised.
Patient would like Dr. Fab Skaggs to call him one day next week have a few questions to ask him. Please advise.  Thanks
Spoke to patient and he said that his sugar has been high this morning it was 211. Over the weekend he had one fbs of 168. Patient is currently taking 80 of his Tresiba since Friday night from on call doctor. He has not been feeling very good lately. He said feels drained, no ambition or motivation. No fever.   Patient had visit on 5-18-20  Patient also needs refill on his metformin
Spoke to patient this morning. Has a glucose monitor now. Lowest 180s. 225 this morning. He requests endocrinology referral.  He feels fatigued. He asks for testosterone check as well. I spoke to him about the idea of insulin pump.
No

## 2023-06-26 ENCOUNTER — OFFICE VISIT (OUTPATIENT)
Dept: FAMILY MEDICINE CLINIC | Age: 58
End: 2023-06-26
Payer: COMMERCIAL

## 2023-06-26 VITALS
OXYGEN SATURATION: 96 % | HEART RATE: 70 BPM | SYSTOLIC BLOOD PRESSURE: 128 MMHG | BODY MASS INDEX: 28.91 KG/M2 | DIASTOLIC BLOOD PRESSURE: 72 MMHG | HEIGHT: 67 IN | WEIGHT: 184.2 LBS

## 2023-06-26 DIAGNOSIS — R05.1 ACUTE COUGH: Primary | ICD-10-CM

## 2023-06-26 DIAGNOSIS — J21.9 ACUTE BRONCHIOLITIS DUE TO UNSPECIFIED ORGANISM: ICD-10-CM

## 2023-06-26 PROCEDURE — 1036F TOBACCO NON-USER: CPT | Performed by: NURSE PRACTITIONER

## 2023-06-26 PROCEDURE — 99213 OFFICE O/P EST LOW 20 MIN: CPT | Performed by: NURSE PRACTITIONER

## 2023-06-26 PROCEDURE — 3017F COLORECTAL CA SCREEN DOC REV: CPT | Performed by: NURSE PRACTITIONER

## 2023-06-26 PROCEDURE — 3078F DIAST BP <80 MM HG: CPT | Performed by: NURSE PRACTITIONER

## 2023-06-26 PROCEDURE — G8427 DOCREV CUR MEDS BY ELIG CLIN: HCPCS | Performed by: NURSE PRACTITIONER

## 2023-06-26 PROCEDURE — G8417 CALC BMI ABV UP PARAM F/U: HCPCS | Performed by: NURSE PRACTITIONER

## 2023-06-26 PROCEDURE — 3074F SYST BP LT 130 MM HG: CPT | Performed by: NURSE PRACTITIONER

## 2023-06-26 RX ORDER — BUDESONIDE, GLYCOPYRROLATE, AND FORMOTEROL FUMARATE 160; 9; 4.8 UG/1; UG/1; UG/1
2 AEROSOL, METERED RESPIRATORY (INHALATION) 2 TIMES DAILY
Qty: 1 EACH | Refills: 0 | COMMUNITY
Start: 2023-06-26 | End: 2023-07-06

## 2023-06-26 RX ORDER — BENZONATATE 100 MG/1
100 CAPSULE ORAL 3 TIMES DAILY PRN
Qty: 42 CAPSULE | Refills: 0 | Status: SHIPPED | OUTPATIENT
Start: 2023-06-26 | End: 2023-07-10

## 2023-06-26 RX ORDER — AMOXICILLIN AND CLAVULANATE POTASSIUM 875; 125 MG/1; MG/1
1 TABLET, FILM COATED ORAL 2 TIMES DAILY
Qty: 14 TABLET | Refills: 0 | Status: SHIPPED | OUTPATIENT
Start: 2023-06-26 | End: 2023-07-03

## 2023-06-26 ASSESSMENT — ENCOUNTER SYMPTOMS
COUGH: 1
VOICE CHANGE: 0
SINUS PAIN: 0
PHOTOPHOBIA: 0
BACK PAIN: 0
NAUSEA: 0
DIARRHEA: 0
SORE THROAT: 0
BLOOD IN STOOL: 0
VOMITING: 0
SHORTNESS OF BREATH: 0
CHEST TIGHTNESS: 1
EYE ITCHING: 0
EYE PAIN: 0
STRIDOR: 0
EYE REDNESS: 0
CONSTIPATION: 0
RHINORRHEA: 0
EYE DISCHARGE: 0
TROUBLE SWALLOWING: 0
SINUS PRESSURE: 0
WHEEZING: 1
ABDOMINAL PAIN: 0
CHOKING: 0
COLOR CHANGE: 0

## 2023-07-13 NOTE — TELEPHONE ENCOUNTER
For 3 month quantity, I sent rx of 1000 insulin pen needles. Let them know. [facility] - Cardiology Stepdown  Heart Transplant  Discharge Summary      Patient Name: Wei Hutson  MRN: [id number]  Admission Date: 7/5/2023  Hospital Length of Stay: 7 days  Discharge Date and Time: 07/13/2023 10:49 AM  Attending Physician: No att. providers found   Discharging Provider: Arthur Rizo PA-C  Primary Care Provider: Adrienne Barba MD     HPI: HPI  59 yo with stage D CHF, NICMP admitted cardiogenic shock on 8/3/21 who is now s/p HM3 on 10/28/21 with AV/MV repair. During the hospital course he developed RUE Embolus after impella removal and and a right brachial, Radial and Ulnar embolectomy. He also developed VT post OP and continues to be on oral amiodarone s/p ICD placement.      Implant Date: 10/28/2021 with R brachial, radial and ulnar embolectomy      Heartmate 3 RPM 4900     INR goal: 2-3   Bridge with Heparin   Antiplatelets: ASA 81     Patient presented to OSH ED with complaints of upper abdominal discomfort and feeling like it is making him short of breath.  He reports N/V/D starting on Saturday with continued abdominal pain currently. Lipase 83425. U/S Abdomen shows gallstones with a mildly thickened gallbladder wall consistent with possible cholecystitis. Transferred given LVAD patient with likely acute gallstone pancreatitis. Admit for pain control and further imaging.    Recent RHC 6/15/23 (Dr. Ho)  Right Heart Pressures  RA: 13/ 14/ 12 RV: 35/ 4/ 12 PA: 35/ 14/ 21 PWP: 17/ 24/ 16 .   CO 5.4  by [place]. CI 2.39 L/min/m2.   O2 Sat: PA 65%.   Normal CO/CI on HM3 at 4900rpm.   Mild-mod right and mildly elevated left sided filling pressures.  Very mild pHTN.  TPG  5   PVR   0.92    CONNIE 1.75    TTE 5/4/23   Technically challenging study.   There is an LVAD present. Base speed is 4900 RPMs. The pump type is a Heartmate III. The interventricular septum appears midline. The aortic valve does not open   The left ventricle is severely enlarged (LVEDD 7.2 cm) with severely decreased  systolic function, EF 10%.   Indeterminate left ventricular diastolic function.   There is mildly to moderately reduced right ventricular systolic function however poorly visualized.   Biatrial enlargement.   There is an Alferi stitch on the mitral scallops, unable to visualize segments. There is mild-to-moderate mitral regurgitation.   Normal central venous pressure (3 mmHg). Unable evaluate PASP due to lack of TR envelope.      Procedure(s) (LRB):  CHOLECYSTECTOMY, LAPAROSCOPIC; requested cardiac anesthesia (N/A)     Hospital Course: Mr Hutson was admitted for gallstone pancreatitis he underwent laparoscopic cholecystectomy 7/10 without any complications. Pathology was consistent with cholecystitis without malignancy. His Tbili normalized and he quickly had his diet advanced without any symptoms. LFTs downtrending to near normal. He is Discharging home today has 2 week f/u with general surgery apointment with general surgery is 7/27.          Goals of Care Treatment Preferences:  Code Status: Full Code      Consults (From admission, onward)        Status Ordering Provider     Inpatient consult to [facility] ([facility])  Once        Provider:  (Not yet assigned)    Completed GRACE MORENO     Inpatient consult to General Surgery  Once        Provider:  (Not yet assigned)    Completed GRACE MORENO     Inpatient consult to Registered Dietitian/Nutritionist  Once        Provider:  (Not yet assigned)    Completed GRACE MORENO          Significant Diagnostic Studies: Labs: All labs within the past 24 hours have been reviewed  Microbiology:   Blood Culture   Lab Results   Component Value Date    LABBLOO No growth after 5 days. 07/06/2023       Pending Diagnostic Studies:     None        Final Active Diagnoses:    Diagnosis Date Noted POA    PRINCIPAL PROBLEM:  Pancreatitis, acute [K85.90] 07/05/2023 Yes    Gallstone pancreatitis [K85.10] 07/12/2023 Yes    Leukocytosis [D72.829]  07/07/2023 Unknown    Moderate protein-calorie malnutrition [E44.0] 07/06/2023 Yes    Long term (current) use of anticoagulants [Z79.01] 09/22/2022 Not Applicable    Ventricular tachycardia [I47.20] 10/30/2021 Yes    LVAD (left ventricular assist device) present [Z95.811] 10/28/2021 Not Applicable    Hypothyroidism due to Hashimoto's thyroiditis [E03.8, E06.3] 08/10/2021 Yes    Paroxysmal atrial fibrillation [I48.0] 08/06/2021 Yes    Counseling regarding advanced care planning and goals of care [Z71.89] 08/06/2021 Not Applicable    Stage 3 chronic kidney disease [N18.30] 07/29/2021 Yes    Chronic combined systolic and diastolic congestive heart failure [I50.42] 07/06/2021 Yes      Problems Resolved During this Admission:      Discharged Condition: stable    Disposition: Home or Self Care    Follow Up:    Patient Instructions:   No discharge procedures on file.  Medications:  Reconciled Home Medications:      Medication List      CHANGE how you take these medications    torsemide 20 MG Tab  Commonly known as: DEMADEX  Take 1 tablet (20 mg total) by mouth every Mon, Wed, Fri.  What changed: additional instructions     warfarin 1 MG tablet  Commonly known as: COUMADIN  Take 1 tablet (1 mg total) by mouth Daily.  What changed:   · how much to take  · additional instructions        CONTINUE taking these medications    amiodarone 200 MG Tab  Commonly known as: PACERONE  Take 1 tablet by mouth once daily     aspirin 81 MG EC tablet  Commonly known as: ECOTRIN  Take 1 tablet (81 mg total) by mouth once daily.     levothyroxine 137 MCG Tab tablet  Commonly known as: SYNTHROID  Take 1 tablet (137 mcg total) by mouth before breakfast.     lisinopriL 5 MG tablet  Commonly known as: PRINIVIL,ZESTRIL  Take 1 tablet (5 mg total) by mouth once daily.     magnesium oxide 400 mg (241.3 mg magnesium) tablet  Commonly known as: MAG-OX  Take 1 tablet (400 mg total) by mouth 2 (two) times daily.     omega-3 acid ethyl esters 1  gram capsule  Commonly known as: LOVAZA  Take 2 capsules (2 g total) by mouth 2 (two) times daily.     tamsulosin 0.4 mg Cap  Commonly known as: FLOMAX  Take 1 capsule (0.4 mg total) by mouth once daily.            Arthur Rzio PA-C  Heart Transplant  [facility] - Cardiology Stepdown

## 2023-08-28 ENCOUNTER — OFFICE VISIT (OUTPATIENT)
Dept: ENDOCRINOLOGY | Age: 58
End: 2023-08-28
Payer: COMMERCIAL

## 2023-08-28 VITALS
RESPIRATION RATE: 14 BRPM | DIASTOLIC BLOOD PRESSURE: 72 MMHG | OXYGEN SATURATION: 97 % | HEART RATE: 68 BPM | SYSTOLIC BLOOD PRESSURE: 133 MMHG | HEIGHT: 67 IN | TEMPERATURE: 97 F | WEIGHT: 182.3 LBS | BODY MASS INDEX: 28.61 KG/M2

## 2023-08-28 DIAGNOSIS — Z79.4 DIABETES MELLITUS TYPE 2, INSULIN DEPENDENT (HCC): ICD-10-CM

## 2023-08-28 DIAGNOSIS — E11.9 DIABETES MELLITUS TYPE 2, INSULIN DEPENDENT (HCC): ICD-10-CM

## 2023-08-28 LAB — HBA1C MFR BLD: 7.5 %

## 2023-08-28 PROCEDURE — 99214 OFFICE O/P EST MOD 30 MIN: CPT | Performed by: INTERNAL MEDICINE

## 2023-08-28 PROCEDURE — G8417 CALC BMI ABV UP PARAM F/U: HCPCS | Performed by: INTERNAL MEDICINE

## 2023-08-28 PROCEDURE — 3078F DIAST BP <80 MM HG: CPT | Performed by: INTERNAL MEDICINE

## 2023-08-28 PROCEDURE — 3017F COLORECTAL CA SCREEN DOC REV: CPT | Performed by: INTERNAL MEDICINE

## 2023-08-28 PROCEDURE — G8427 DOCREV CUR MEDS BY ELIG CLIN: HCPCS | Performed by: INTERNAL MEDICINE

## 2023-08-28 PROCEDURE — 1036F TOBACCO NON-USER: CPT | Performed by: INTERNAL MEDICINE

## 2023-08-28 PROCEDURE — 3075F SYST BP GE 130 - 139MM HG: CPT | Performed by: INTERNAL MEDICINE

## 2023-08-28 PROCEDURE — 83036 HEMOGLOBIN GLYCOSYLATED A1C: CPT | Performed by: INTERNAL MEDICINE

## 2023-08-28 PROCEDURE — 3051F HG A1C>EQUAL 7.0%<8.0%: CPT | Performed by: INTERNAL MEDICINE

## 2023-08-28 PROCEDURE — 2022F DILAT RTA XM EVC RTNOPTHY: CPT | Performed by: INTERNAL MEDICINE

## 2023-08-28 RX ORDER — INSULIN LISPRO 100 [IU]/ML
INJECTION, SOLUTION INTRAVENOUS; SUBCUTANEOUS
Qty: 60 ML | Refills: 1 | Status: SHIPPED | OUTPATIENT
Start: 2023-08-28

## 2023-08-28 RX ORDER — INSULIN GLARGINE 100 [IU]/ML
INJECTION, SOLUTION SUBCUTANEOUS
Qty: 75 ML | Refills: 1 | Status: SHIPPED | OUTPATIENT
Start: 2023-08-28

## 2023-08-28 RX ORDER — DULAGLUTIDE 3 MG/.5ML
INJECTION, SOLUTION SUBCUTANEOUS
Qty: 13 ADJUSTABLE DOSE PRE-FILLED PEN SYRINGE | Refills: 1 | Status: SHIPPED | OUTPATIENT
Start: 2023-08-28

## 2023-08-28 NOTE — PROGRESS NOTES
Seen as  patient for diabetes    Referred by Dr. Kellie Lamb    Interim:    Did not bring meter  CGM not covered    Reports feels wobbly at time  Saw neurologist and ENT   was told may be due to neuropathy  Will see [facility]    Diagnosed with Type 2 diabetes mellitus in 2002    Known diabetic complications: Retinopathy, neuropathy  Uncontrolled, moderate    Current diabetic medications     Reports has had to change medication due to insurance    Lantus 80 units  Humalog SSI  18 units TID   2 for 50 > 150    Jardiance  Trulicity 3  Amaryl 4mg  Metformin 1000mg with supper    Last A1c 7.5% <-----7.7%<----7.6%<------  8%<-----7.5%<----8.1%<-----  7.9%<--- 7.7%<----8.1%<-----8.7%<--- 9.2 <--- 8.1 <--- 8.4    Prior visit with dietician: Yes   Current diet: on average, 3 meals per day   Current exercise: Exercise  Current monitoring regimen: home blood tests - 2 times daily     Has brought blood glucose log/meter: no  Home blood sugar records:  Any episodes of hypoglycemia? Worsened by high CHO    CABG in 2016  PCI 2002    Hyperlipidemia:   For   Years  Takes lipitor 80mg  Controlled  LDL 60 on 1/20  LDL 39 on 4/23    Hypertension for years  Stable now reports highs in the past  Takes norvasc 10mg Metoprolol 50mg BID ramipril 10mg HCTZ 25mg    Plasma MN  1.19    nl 24 hour urine level    Testosterone 488 Prolactin 12.3 TSH nl    Aldosterone 15.5 renin 6      Last eye exam: 9/19  due  Last foot exam: 9/22    He has microalbuminuria  Last microalbumin to creatinine ratio: 1/21 on 1/20---> 4/23    He has neuropathy  On neurontin 300mg /300/600  FH: Dad CAD   Mom CAD    PMH  1.DM  2. HLD  3.CAD    SH: No smoking    Past Surgical History:   Procedure Laterality Date    CORONARY ARTERY BYPASS GRAFT REDO  2016     Current Outpatient Medications   Medication Sig Dispense Refill    Dulaglutide (TRULICITY) 3 AZ/2.5OP SOPN Dispense 3mg pens.   3mg SC weekly 13 Adjustable Dose Pre-filled Pen Syringe 1    empagliflozin

## 2023-10-10 DIAGNOSIS — Z79.4 DIABETES MELLITUS TYPE 2, INSULIN DEPENDENT (HCC): ICD-10-CM

## 2023-10-10 DIAGNOSIS — I10 ESSENTIAL HYPERTENSION: ICD-10-CM

## 2023-10-10 DIAGNOSIS — E11.9 DIABETES MELLITUS TYPE 2, INSULIN DEPENDENT (HCC): ICD-10-CM

## 2023-10-10 RX ORDER — RAMIPRIL 10 MG/1
10 CAPSULE ORAL DAILY
Qty: 90 CAPSULE | Refills: 3 | Status: SHIPPED | OUTPATIENT
Start: 2023-10-10

## 2023-10-10 RX ORDER — GLIMEPIRIDE 4 MG/1
4 TABLET ORAL
Qty: 90 TABLET | Refills: 0 | Status: SHIPPED | OUTPATIENT
Start: 2023-10-10

## 2023-10-10 NOTE — TELEPHONE ENCOUNTER
Medication:   Requested Prescriptions     Pending Prescriptions Disp Refills    ramipril (ALTACE) 10 MG capsule [Pharmacy Med Name: Ramipril 10 MG Oral Capsule] 90 capsule 3     Sig: TAKE 1 CAPSULE BY MOUTH DAILY       Last Filled:      Patient Phone Number: [phone] (home) [phone] (work)    Last appt: 6/26/2023   Next appt: 10/10/2023    Last BMP:   Lab Results   Component Value Date/Time     04/27/2023 07:37 AM    K 4.1 04/27/2023 07:37 AM     04/27/2023 07:37 AM    CO2 28 04/27/2023 07:37 AM    ANIONGAP 6 04/27/2023 07:37 AM    GLUCOSE 155 04/27/2023 07:37 AM    BUN 18 04/27/2023 07:37 AM    CREATININE 1.19 04/27/2023 07:37 AM    LABGLOM 72 08/05/2020 07:34 AM    GFRAA 83 08/05/2020 07:34 AM    CALCIUM 9.5 04/27/2023 07:37 AM      Last CMP:   Lab Results   Component Value Date/Time     04/27/2023 07:37 AM    K 4.1 04/27/2023 07:37 AM     04/27/2023 07:37 AM    CO2 28 04/27/2023 07:37 AM    ANIONGAP 6 04/27/2023 07:37 AM    GLUCOSE 155 04/27/2023 07:37 AM    BUN 18 04/27/2023 07:37 AM    CREATININE 1.19 04/27/2023 07:37 AM    LABGLOM 72 08/05/2020 07:34 AM    GFRAA 83 08/05/2020 07:34 AM    CALCIUM 9.5 04/27/2023 07:37 AM    PROT 7.3 04/27/2023 07:37 AM    LABALBU 4.2 04/27/2023 07:37 AM    BILITOT 0.6 04/27/2023 07:37 AM    ALKPHOS 66 04/27/2023 07:37 AM    ALT 37 04/27/2023 07:37 AM    AST 27 04/27/2023 07:37 AM     Last Renal Function:   Lab Results   Component Value Date/Time     04/27/2023 07:37 AM    K 4.1 04/27/2023 07:37 AM     04/27/2023 07:37 AM    CO2 28 04/27/2023 07:37 AM    GLUCOSE 155 04/27/2023 07:37 AM    BUN 18 04/27/2023 07:37 AM    CREATININE 1.19 04/27/2023 07:37 AM    LABALBU 4.2 04/27/2023 07:37 AM    CALCIUM 9.5 04/27/2023 07:37 AM    GFRAA 83 08/05/2020 07:34 AM       Last OARRS:        No data to display                Preferred Pharmacy:   [address] [id number] [place] Cole, [address] [facility] [address] [phone]  [facility]

## 2023-10-10 NOTE — TELEPHONE ENCOUNTER
Medication:   Requested Prescriptions     Pending Prescriptions Disp Refills    metoprolol tartrate (LOPRESSOR) 50 MG tablet [Pharmacy Med Name: Metoprolol Tartrate 50 MG Oral Tablet] 180 tablet 3     Sig: TAKE 1 TABLET BY MOUTH TWICE  DAILY    amLODIPine (NORVASC) 10 MG tablet [Pharmacy Med Name: amLODIPine Besylate 10 MG Oral Tablet] 90 tablet 3     Sig: TAKE 1 TABLET BY MOUTH DAILY    hydroCHLOROthiazide (HYDRODIURIL) 25 MG tablet [Pharmacy Med Name: hydroCHLOROthiazide 25 MG Oral Tablet] 90 tablet 3     Sig: TAKE 1 TABLET BY MOUTH DAILY    atorvastatin (LIPITOR) 80 MG tablet [Pharmacy Med Name: Atorvastatin Calcium 80 MG Oral Tablet] 90 tablet 3     Sig: TAKE 1 TABLET BY MOUTH DAILY       Last Filled:      Patient Phone Number: [phone] (home) [phone] (work)    Last appt: 6/26/2023   Next appt: 11/30/2023    Last Lipid:   Lab Results   Component Value Date/Time    CHOL 103 04/27/2023 07:37 AM    TRIG 166 04/27/2023 07:37 AM    HDL 31 04/27/2023 07:37 AM    LDLCALC 39 04/27/2023 07:37 AM       Last OARRS:        No data to display                Preferred Pharmacy:   [facility] [id number] - [address] [address], [address] [place] [address]  Phone: [phone] Fax: [phone]    [facility] ([address] [place] [address]) - [address] [phone] - f [phone] [address] [facility]  [address]  [address] [place] [address]  Phone: [phone] Fax: [address] [phone] f [phone]  [facility] [address]  Phone: [phone] Fax: [place] [address] [address] [facility] [phone] - F [phone] [address] [facility] [address]  Phone: [phone] Fax: [phone]    Medication:   Requested Prescriptions     Pending Prescriptions Disp Refills    metoprolol

## 2023-10-11 RX ORDER — AMLODIPINE BESYLATE 10 MG/1
10 TABLET ORAL DAILY
Qty: 90 TABLET | Refills: 3 | Status: SHIPPED | OUTPATIENT
Start: 2023-10-11

## 2023-10-11 RX ORDER — HYDROCHLOROTHIAZIDE 25 MG/1
25 TABLET ORAL DAILY
Qty: 90 TABLET | Refills: 3 | Status: SHIPPED | OUTPATIENT
Start: 2023-10-11

## 2023-10-11 RX ORDER — ATORVASTATIN CALCIUM 80 MG/1
80 TABLET, FILM COATED ORAL DAILY
Qty: 90 TABLET | Refills: 3 | Status: SHIPPED | OUTPATIENT
Start: 2023-10-11

## 2023-10-11 RX ORDER — METOPROLOL TARTRATE 50 MG/1
50 TABLET, FILM COATED ORAL 2 TIMES DAILY
Qty: 180 TABLET | Refills: 3 | Status: SHIPPED | OUTPATIENT
Start: 2023-10-11

## 2023-11-30 ENCOUNTER — OFFICE VISIT (OUTPATIENT)
Dept: FAMILY MEDICINE CLINIC | Age: 58
End: 2023-11-30
Payer: COMMERCIAL

## 2023-11-30 VITALS
HEART RATE: 69 BPM | DIASTOLIC BLOOD PRESSURE: 76 MMHG | WEIGHT: 184 LBS | BODY MASS INDEX: 28.88 KG/M2 | HEIGHT: 67 IN | OXYGEN SATURATION: 99 % | SYSTOLIC BLOOD PRESSURE: 118 MMHG

## 2023-11-30 DIAGNOSIS — E11.3311 TYPE 2 DIABETES MELLITUS WITH RIGHT EYE AFFECTED BY MODERATE NONPROLIFERATIVE RETINOPATHY AND MACULAR EDEMA, WITH LONG-TERM CURRENT USE OF INSULIN (HCC): Primary | ICD-10-CM

## 2023-11-30 DIAGNOSIS — Z79.4 TYPE 2 DIABETES MELLITUS WITH RIGHT EYE AFFECTED BY MODERATE NONPROLIFERATIVE RETINOPATHY AND MACULAR EDEMA, WITH LONG-TERM CURRENT USE OF INSULIN (HCC): Primary | ICD-10-CM

## 2023-11-30 DIAGNOSIS — I10 ESSENTIAL HYPERTENSION: ICD-10-CM

## 2023-11-30 DIAGNOSIS — Z12.5 SCREENING PSA (PROSTATE SPECIFIC ANTIGEN): ICD-10-CM

## 2023-11-30 DIAGNOSIS — E78.2 MIXED HYPERLIPIDEMIA: ICD-10-CM

## 2023-11-30 PROBLEM — H43.11 VITREOUS HEMORRHAGE OF RIGHT EYE (HCC): Status: RESOLVED | Noted: 2022-11-30 | Resolved: 2023-11-30

## 2023-11-30 PROCEDURE — 99214 OFFICE O/P EST MOD 30 MIN: CPT | Performed by: FAMILY MEDICINE

## 2023-11-30 PROCEDURE — 3017F COLORECTAL CA SCREEN DOC REV: CPT | Performed by: FAMILY MEDICINE

## 2023-11-30 PROCEDURE — 90674 CCIIV4 VAC NO PRSV 0.5 ML IM: CPT | Performed by: FAMILY MEDICINE

## 2023-11-30 PROCEDURE — G8482 FLU IMMUNIZE ORDER/ADMIN: HCPCS | Performed by: FAMILY MEDICINE

## 2023-11-30 PROCEDURE — 3074F SYST BP LT 130 MM HG: CPT | Performed by: FAMILY MEDICINE

## 2023-11-30 PROCEDURE — 1036F TOBACCO NON-USER: CPT | Performed by: FAMILY MEDICINE

## 2023-11-30 PROCEDURE — 2022F DILAT RTA XM EVC RTNOPTHY: CPT | Performed by: FAMILY MEDICINE

## 2023-11-30 PROCEDURE — G8417 CALC BMI ABV UP PARAM F/U: HCPCS | Performed by: FAMILY MEDICINE

## 2023-11-30 PROCEDURE — 3078F DIAST BP <80 MM HG: CPT | Performed by: FAMILY MEDICINE

## 2023-11-30 PROCEDURE — 90471 IMMUNIZATION ADMIN: CPT | Performed by: FAMILY MEDICINE

## 2023-11-30 PROCEDURE — 90472 IMMUNIZATION ADMIN EACH ADD: CPT | Performed by: FAMILY MEDICINE

## 2023-11-30 PROCEDURE — 3051F HG A1C>EQUAL 7.0%<8.0%: CPT | Performed by: FAMILY MEDICINE

## 2023-11-30 PROCEDURE — 90750 HZV VACC RECOMBINANT IM: CPT | Performed by: FAMILY MEDICINE

## 2023-11-30 PROCEDURE — G8428 CUR MEDS NOT DOCUMENT: HCPCS | Performed by: FAMILY MEDICINE

## 2023-11-30 RX ORDER — NITROGLYCERIN 0.4 MG/1
0.4 TABLET SUBLINGUAL EVERY 5 MIN PRN
Qty: 25 TABLET | Refills: 0 | Status: SHIPPED | OUTPATIENT
Start: 2023-11-30

## 2023-11-30 NOTE — PROGRESS NOTES
Katy Nieves (:  1965) is a 62 y.o. male,Established patient , here for evaluation of the following chief complaint(s):  6 Month Follow-Up         ASSESSMENT/PLAN:  Cristhian Joseph was seen today for 6 month follow-up. 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 Mixed hyperlipidemia  Stable on high dose statin  Other orders  -     nitroGLYCERIN (NITROSTAT) 0.4 MG SL tablet; Place 1 tablet under the tongue every 5 minutes as needed for Chest pain  -     Zoster, SHINGRIX, (18 yrs +), IM  -     Influenza, FLUCELVAX, (age 10 mo+), IM, Preservative Free, 0.5 mL         No follow-ups on file. Subjective   SUBJECTIVE/OBJECTIVE:  BECK  Pt is a of 62 y.o. male comes in today with   Chief Complaint   Patient presents with    6 Month Follow-Up     follow up dm and hypertension  Has been active. Feeling well. Vitals:    23 0811   BP: 118/76   Pulse: 69   SpO2: 99%   Weight: 83.5 kg (184 lb)   Height: 1.702 m (5' 7\")       Past Medical History:Reviewed  Medications:Reviewed. No Known Allergies   Social hx:Reviewed. Social History     Tobacco Use   Smoking Status Never   Smokeless Tobacco Never     Review of Systems       Objective   Physical Exam  Constitutional:       Appearance: Normal appearance. Cardiovascular:      Rate and Rhythm: Normal rate and regular rhythm.    Pulmonary:      Effort: Pulmonary effort is normal.      Breath

## 2024-01-09 ENCOUNTER — OFFICE VISIT (OUTPATIENT)
Dept: ENDOCRINOLOGY | Age: 59
End: 2024-01-09
Payer: COMMERCIAL

## 2024-01-09 VITALS
TEMPERATURE: 98 F | BODY MASS INDEX: 28.88 KG/M2 | DIASTOLIC BLOOD PRESSURE: 74 MMHG | RESPIRATION RATE: 15 BRPM | OXYGEN SATURATION: 97 % | SYSTOLIC BLOOD PRESSURE: 135 MMHG | HEIGHT: 67 IN | HEART RATE: 80 BPM | WEIGHT: 184 LBS

## 2024-01-09 DIAGNOSIS — G62.9 NEUROPATHY: Primary | ICD-10-CM

## 2024-01-09 DIAGNOSIS — E11.9 DIABETES MELLITUS TYPE 2, INSULIN DEPENDENT (HCC): ICD-10-CM

## 2024-01-09 DIAGNOSIS — R26.89 BALANCE PROBLEM: ICD-10-CM

## 2024-01-09 DIAGNOSIS — Z79.4 DIABETES MELLITUS TYPE 2, INSULIN DEPENDENT (HCC): ICD-10-CM

## 2024-01-09 LAB — HBA1C MFR BLD: 8.5 %

## 2024-01-09 PROCEDURE — 1036F TOBACCO NON-USER: CPT | Performed by: INTERNAL MEDICINE

## 2024-01-09 PROCEDURE — 99214 OFFICE O/P EST MOD 30 MIN: CPT | Performed by: INTERNAL MEDICINE

## 2024-01-09 PROCEDURE — 3017F COLORECTAL CA SCREEN DOC REV: CPT | Performed by: INTERNAL MEDICINE

## 2024-01-09 PROCEDURE — 3075F SYST BP GE 130 - 139MM HG: CPT | Performed by: INTERNAL MEDICINE

## 2024-01-09 PROCEDURE — G8482 FLU IMMUNIZE ORDER/ADMIN: HCPCS | Performed by: INTERNAL MEDICINE

## 2024-01-09 PROCEDURE — 83036 HEMOGLOBIN GLYCOSYLATED A1C: CPT | Performed by: INTERNAL MEDICINE

## 2024-01-09 PROCEDURE — G8427 DOCREV CUR MEDS BY ELIG CLIN: HCPCS | Performed by: INTERNAL MEDICINE

## 2024-01-09 PROCEDURE — 3046F HEMOGLOBIN A1C LEVEL >9.0%: CPT | Performed by: INTERNAL MEDICINE

## 2024-01-09 PROCEDURE — 2022F DILAT RTA XM EVC RTNOPTHY: CPT | Performed by: INTERNAL MEDICINE

## 2024-01-09 PROCEDURE — 3078F DIAST BP <80 MM HG: CPT | Performed by: INTERNAL MEDICINE

## 2024-01-09 PROCEDURE — G8417 CALC BMI ABV UP PARAM F/U: HCPCS | Performed by: INTERNAL MEDICINE

## 2024-01-09 RX ORDER — DULAGLUTIDE 3 MG/.5ML
INJECTION, SOLUTION SUBCUTANEOUS
Qty: 13 ADJUSTABLE DOSE PRE-FILLED PEN SYRINGE | Refills: 1 | Status: SHIPPED | OUTPATIENT
Start: 2024-01-09

## 2024-01-09 RX ORDER — INSULIN LISPRO 100 [IU]/ML
INJECTION, SOLUTION INTRAVENOUS; SUBCUTANEOUS
Qty: 60 ML | Refills: 1 | Status: SHIPPED | OUTPATIENT
Start: 2024-01-09

## 2024-01-09 RX ORDER — INSULIN GLARGINE 100 [IU]/ML
INJECTION, SOLUTION SUBCUTANEOUS
Qty: 75 ML | Refills: 1 | Status: SHIPPED | OUTPATIENT
Start: 2024-01-09

## 2024-01-09 NOTE — PROGRESS NOTES
Seen as  patient for diabetes    Referred by Dr. Castaneda    Interim:    Did not bring meter  Checking glucose    CGM not covered    Reports feels wobbly at time  Saw neurologist and ENT   was told may be due to neuropathy  Will see balance center    Diagnosed with Type 2 diabetes mellitus in 2002    Known diabetic complications: Retinopathy, neuropathy  Uncontrolled, moderate    Current diabetic medications     Reports has had to change medication due to insurance    Lantus 80 units  Humalog SSI  18 units TID   2 for 50 > 150    Jardiance  Trulicity 3  Amaryl 4mg  Metformin 1000mg with supper    Last A1c 7.5% <-----7.7%<----7.6%<------  8%<-----7.5%<----8.1%<-----  7.9%<--- 7.7%<----8.1%<-----8.7%<--- 9.2 <--- 8.1 <--- 8.4    Prior visit with dietician: Yes   Current diet: on average, 3 meals per day   Current exercise: Exercise  Current monitoring regimen: home blood tests - 2 times daily     Has brought blood glucose log/meter: no  Home blood sugar records:  Any episodes of hypoglycemia?   Worsened by high CHO    CABG in 2016  PCI 2002    Hyperlipidemia:   For   Years  Takes lipitor 80mg  Controlled  LDL 60 on 1/20  LDL 39 on 4/23    Hypertension for years  Stable now reports highs in the past  Takes norvasc 10mg Metoprolol 50mg BID ramipril 10mg HCTZ 25mg    Plasma MN  1.19    nl 24 hour urine level    Testosterone 488 Prolactin 12.3 TSH nl    Aldosterone 15.5 renin 6      Last eye exam: 1/24  Last foot exam: 1/24    He has microalbuminuria  Last microalbumin to creatinine ratio: 1/21 on 1/20---> 4/23    He has neuropathy  He was On neurontin 300mg /300/600  Reports balance issues    FH: Dad CAD   Mom CAD    PMH  1.DM  2.HLD  3.CAD    SH: No smoking    Past Surgical History:   Procedure Laterality Date    CORONARY ARTERY BYPASS GRAFT REDO  2016     Current Outpatient Medications   Medication Sig Dispense Refill    glimepiride (AMARYL) 4 MG tablet TAKE 1 TABLET BY MOUTH AT NIGHT 90 tablet 3    nitroGLYCERIN

## 2024-01-11 ENCOUNTER — TELEPHONE (OUTPATIENT)
Dept: ENDOCRINOLOGY | Age: 59
End: 2024-01-11

## 2024-01-11 DIAGNOSIS — E11.9 DIABETES MELLITUS TYPE 2, INSULIN DEPENDENT (HCC): ICD-10-CM

## 2024-01-11 DIAGNOSIS — Z79.4 DIABETES MELLITUS TYPE 2, INSULIN DEPENDENT (HCC): ICD-10-CM

## 2024-01-11 NOTE — TELEPHONE ENCOUNTER
Pt called to inform [facility] pt asking to send rx t this pharmacy pt asking for 3 month supply      Continuous Blood Gluc Sensor (FREESTYLE DILMA 2 SENSOR) [facility]       [phone]     Only number pt has

## 2024-01-18 ENCOUNTER — TELEPHONE (OUTPATIENT)
Dept: ENDOCRINOLOGY | Age: 59
End: 2024-01-18

## 2024-01-18 NOTE — TELEPHONE ENCOUNTER
Pt called saying insurance asking for PA for rx pharmacy has rx just waiting for the PA to go through      Continuous Blood Gluc Sensor (FREESTYLE DILMA 2 SENSOR) [facility]       [facility] - [place], [place] - [address] -  [phone] - F [phone] [address] [place] [address], [facility] [address]  Phone: [phone]  Fax: [phone]

## 2024-01-19 NOTE — TELEPHONE ENCOUNTER
Submitted PA for Freestyle Cristal Sensor  Via [facility] (Key: [id number])   STATUS: Approved today  Request Reference Number: [id number]. [employer] LIBR KIT 2 SENSOR is approved through 01/19/2025. Your patient may now fill this prescription and it will be covered.

## 2024-03-27 DIAGNOSIS — E11.9 DIABETES MELLITUS TYPE 2, INSULIN DEPENDENT (HCC): ICD-10-CM

## 2024-03-27 DIAGNOSIS — Z79.4 DIABETES MELLITUS TYPE 2, INSULIN DEPENDENT (HCC): ICD-10-CM

## 2024-03-28 NOTE — TELEPHONE ENCOUNTER
Medication:   Requested Prescriptions     Pending Prescriptions Disp Refills    metFORMIN (GLUCOPHAGE) 1000 MG tablet [Pharmacy Med Name: metFORMIN HCl 1000 MG Oral Tablet] 90 tablet 1     Sig: TAKE 1 TABLET BY MOUTH DAILY  WITH SUPPER    empagliflozin (JARDIANCE) 10 MG tablet 90 tablet 1     Sig: TAKE 1 TABLET BY MOUTH DAILY       Last Filled:      Patient Phone Number: [phone] (home) [phone] (work)    Last appt: 1/9/2024   Next appt: 4/30/2024    Last Labs DM:   Lab Results   Component Value Date/Time    LABA1C 8.5 01/09/2024 04:55 PM

## 2024-04-30 ENCOUNTER — OFFICE VISIT (OUTPATIENT)
Dept: ENDOCRINOLOGY | Age: 59
End: 2024-04-30
Payer: COMMERCIAL

## 2024-04-30 VITALS
TEMPERATURE: 98 F | BODY MASS INDEX: 29.35 KG/M2 | HEIGHT: 67 IN | RESPIRATION RATE: 14 BRPM | SYSTOLIC BLOOD PRESSURE: 137 MMHG | WEIGHT: 187 LBS | DIASTOLIC BLOOD PRESSURE: 72 MMHG | HEART RATE: 77 BPM

## 2024-04-30 DIAGNOSIS — E11.9 DIABETES MELLITUS TYPE 2, INSULIN DEPENDENT (HCC): ICD-10-CM

## 2024-04-30 DIAGNOSIS — Z79.4 DIABETES MELLITUS TYPE 2, INSULIN DEPENDENT (HCC): ICD-10-CM

## 2024-04-30 LAB — HBA1C MFR BLD: 6.9 %

## 2024-04-30 PROCEDURE — 3017F COLORECTAL CA SCREEN DOC REV: CPT | Performed by: INTERNAL MEDICINE

## 2024-04-30 PROCEDURE — 95251 CONT GLUC MNTR ANALYSIS I&R: CPT | Performed by: INTERNAL MEDICINE

## 2024-04-30 PROCEDURE — 3075F SYST BP GE 130 - 139MM HG: CPT | Performed by: INTERNAL MEDICINE

## 2024-04-30 PROCEDURE — 3044F HG A1C LEVEL LT 7.0%: CPT | Performed by: INTERNAL MEDICINE

## 2024-04-30 PROCEDURE — 2022F DILAT RTA XM EVC RTNOPTHY: CPT | Performed by: INTERNAL MEDICINE

## 2024-04-30 PROCEDURE — 3078F DIAST BP <80 MM HG: CPT | Performed by: INTERNAL MEDICINE

## 2024-04-30 PROCEDURE — G8427 DOCREV CUR MEDS BY ELIG CLIN: HCPCS | Performed by: INTERNAL MEDICINE

## 2024-04-30 PROCEDURE — G8417 CALC BMI ABV UP PARAM F/U: HCPCS | Performed by: INTERNAL MEDICINE

## 2024-04-30 PROCEDURE — 83036 HEMOGLOBIN GLYCOSYLATED A1C: CPT | Performed by: INTERNAL MEDICINE

## 2024-04-30 PROCEDURE — 99214 OFFICE O/P EST MOD 30 MIN: CPT | Performed by: INTERNAL MEDICINE

## 2024-04-30 PROCEDURE — 1036F TOBACCO NON-USER: CPT | Performed by: INTERNAL MEDICINE

## 2024-04-30 RX ORDER — DULAGLUTIDE 3 MG/.5ML
INJECTION, SOLUTION SUBCUTANEOUS
Qty: 13 ADJUSTABLE DOSE PRE-FILLED PEN SYRINGE | Refills: 1 | Status: SHIPPED | OUTPATIENT
Start: 2024-04-30

## 2024-04-30 RX ORDER — INSULIN GLARGINE 100 [IU]/ML
INJECTION, SOLUTION SUBCUTANEOUS
Qty: 75 ML | Refills: 1 | Status: SHIPPED | OUTPATIENT
Start: 2024-04-30

## 2024-04-30 RX ORDER — INSULIN LISPRO 100 [IU]/ML
INJECTION, SOLUTION INTRAVENOUS; SUBCUTANEOUS
Qty: 60 ML | Refills: 1 | Status: SHIPPED | OUTPATIENT
Start: 2024-04-30

## 2024-04-30 RX ORDER — GLIMEPIRIDE 4 MG/1
4 TABLET ORAL
Qty: 90 TABLET | Refills: 3 | Status: SHIPPED | OUTPATIENT
Start: 2024-04-30

## 2024-04-30 RX ORDER — BLOOD-GLUCOSE SENSOR
EACH MISCELLANEOUS
Qty: 6 EACH | Refills: 3 | Status: SHIPPED | OUTPATIENT
Start: 2024-04-30

## 2024-04-30 NOTE — PROGRESS NOTES
facility-administered medications for this visit.       Review of Systems  Please see scanned document dated and signed      Objective:     Vitals:    04/30/24 1624   BP: 137/72   Pulse: 77   Resp: 14   Temp: 98 °F (36.7 °C)        /72   Pulse 77   Temp 98 °F (36.7 °C)   Resp 14   Ht 1.702 m (5' 7.01\")   Wt 84.8 kg (187 lb)   BMI 29.28 kg/m²   Wt Readings from Last 3 Encounters:   04/30/24 84.8 kg (187 lb)   01/09/24 83.5 kg (184 lb)   11/30/23 83.5 kg (184 lb)     Constitutional: Well-developed, appears stated age, cooperative, in no acute distress  H/E/N/M/T:atraumatic, normocephalic, external ears, nose, lips normal without lesions  Eyes: Lids, lashes, conjunctivae and sclerae normal, No proptosis, no redness  Neck: supple, symmetrical, no swelling  Skin: No obvious rashes or lesions present.  Skin and hair texture normal  Psychiatric: Judgement and Insight:  judgement and insight appear normal  Neuro: Normal without focal findings, speech is normal normal, speech is spontaneous  Chest: No labored breathing, no chest deformity, no stridor  Musculoskeletal: No joint deformity, swelling    Foot exam 1/24  No ulcer decrease monofilament  Trace swelling      Lab Reviewed   No components found for: \"CHLPL\"  Lab Results   Component Value Date    TRIG 166 (H) 04/27/2023    TRIG 141 01/15/2020     Lab Results   Component Value Date    HDL 31 (L) 04/27/2023    HDL 33 (L) 01/15/2020     Lab Results   Component Value Date    LDLCALC 39 04/27/2023    LDLCALC 60 01/15/2020     No components found for: \"LABVLDL\"  Lab Results   Component Value Date    LABA1C 8.5 01/09/2024       Assessment:     Clayton Garcia is a 58 y.o. male with :    1.T2DM : Fairly controlled, on insulin. Discussed goals, risk of complications . Advised diet changes.    Started on trulicity and jardiance . A1c improved   Needs glucose monitoring  Reviewed CGM, night low but no symptoms. Advised to check fingerstick as may be false

## 2024-05-13 ENCOUNTER — TELEPHONE (OUTPATIENT)
Dept: NEUROLOGY | Age: 59
End: 2024-05-13

## 2024-05-13 NOTE — TELEPHONE ENCOUNTER
LMOR to r/c call re: rescheduling appt from June 3rd to Wed May 15th arrival time 12:10 with Justin.

## 2024-05-30 ENCOUNTER — OFFICE VISIT (OUTPATIENT)
Dept: FAMILY MEDICINE CLINIC | Age: 59
End: 2024-05-30
Payer: COMMERCIAL

## 2024-05-30 VITALS
SYSTOLIC BLOOD PRESSURE: 130 MMHG | WEIGHT: 185 LBS | BODY MASS INDEX: 29.03 KG/M2 | OXYGEN SATURATION: 98 % | DIASTOLIC BLOOD PRESSURE: 72 MMHG | HEART RATE: 74 BPM | HEIGHT: 67 IN

## 2024-05-30 DIAGNOSIS — Z79.4 DIABETES MELLITUS TYPE 2, INSULIN DEPENDENT (HCC): ICD-10-CM

## 2024-05-30 DIAGNOSIS — E11.42 DIABETIC PERIPHERAL NEUROPATHY (HCC): Primary | ICD-10-CM

## 2024-05-30 DIAGNOSIS — I10 ESSENTIAL HYPERTENSION: ICD-10-CM

## 2024-05-30 DIAGNOSIS — M25.519 SHOULDER PAIN, UNSPECIFIED CHRONICITY, UNSPECIFIED LATERALITY: ICD-10-CM

## 2024-05-30 DIAGNOSIS — F41.9 ANXIETY: ICD-10-CM

## 2024-05-30 DIAGNOSIS — E11.9 DIABETES MELLITUS TYPE 2, INSULIN DEPENDENT (HCC): ICD-10-CM

## 2024-05-30 PROCEDURE — 3075F SYST BP GE 130 - 139MM HG: CPT | Performed by: FAMILY MEDICINE

## 2024-05-30 PROCEDURE — 1036F TOBACCO NON-USER: CPT | Performed by: FAMILY MEDICINE

## 2024-05-30 PROCEDURE — 99214 OFFICE O/P EST MOD 30 MIN: CPT | Performed by: FAMILY MEDICINE

## 2024-05-30 PROCEDURE — 2022F DILAT RTA XM EVC RTNOPTHY: CPT | Performed by: FAMILY MEDICINE

## 2024-05-30 PROCEDURE — 3044F HG A1C LEVEL LT 7.0%: CPT | Performed by: FAMILY MEDICINE

## 2024-05-30 PROCEDURE — 3017F COLORECTAL CA SCREEN DOC REV: CPT | Performed by: FAMILY MEDICINE

## 2024-05-30 PROCEDURE — G8417 CALC BMI ABV UP PARAM F/U: HCPCS | Performed by: FAMILY MEDICINE

## 2024-05-30 PROCEDURE — 3078F DIAST BP <80 MM HG: CPT | Performed by: FAMILY MEDICINE

## 2024-05-30 PROCEDURE — G8427 DOCREV CUR MEDS BY ELIG CLIN: HCPCS | Performed by: FAMILY MEDICINE

## 2024-05-30 RX ORDER — NITROGLYCERIN 0.4 MG/1
0.4 TABLET SUBLINGUAL EVERY 5 MIN PRN
Qty: 25 TABLET | Refills: 0 | Status: SHIPPED | OUTPATIENT
Start: 2024-05-30

## 2024-05-30 RX ORDER — FUROSEMIDE 20 MG/1
20 TABLET ORAL DAILY
Qty: 90 TABLET | Refills: 1 | Status: SHIPPED | OUTPATIENT
Start: 2024-05-30

## 2024-05-30 RX ORDER — ATORVASTATIN CALCIUM 80 MG/1
80 TABLET, FILM COATED ORAL DAILY
Qty: 90 TABLET | Refills: 3 | Status: SHIPPED | OUTPATIENT
Start: 2024-05-30

## 2024-05-30 RX ORDER — RAMIPRIL 10 MG/1
10 CAPSULE ORAL DAILY
Qty: 90 CAPSULE | Refills: 3 | Status: SHIPPED | OUTPATIENT
Start: 2024-05-30

## 2024-05-30 RX ORDER — HYDROCHLOROTHIAZIDE 25 MG/1
25 TABLET ORAL DAILY
Qty: 90 TABLET | Refills: 3 | Status: SHIPPED | OUTPATIENT
Start: 2024-05-30

## 2024-05-30 RX ORDER — METOPROLOL TARTRATE 50 MG/1
50 TABLET, FILM COATED ORAL 2 TIMES DAILY
Qty: 180 TABLET | Refills: 3 | Status: SHIPPED | OUTPATIENT
Start: 2024-05-30

## 2024-05-30 RX ORDER — AMLODIPINE BESYLATE 10 MG/1
10 TABLET ORAL DAILY
Qty: 90 TABLET | Refills: 3 | Status: SHIPPED | OUTPATIENT
Start: 2024-05-30

## 2024-05-30 RX ORDER — GABAPENTIN 100 MG/1
100 CAPSULE ORAL 3 TIMES DAILY
Qty: 270 CAPSULE | Refills: 1 | Status: SHIPPED | OUTPATIENT
Start: 2024-05-30 | End: 2024-11-26

## 2024-05-30 RX ORDER — ALPRAZOLAM 0.5 MG/1
0.5 TABLET ORAL DAILY
Qty: 30 TABLET | Refills: 0 | Status: SHIPPED | OUTPATIENT
Start: 2024-05-30 | End: 2024-06-29

## 2024-05-30 RX ORDER — IBUPROFEN 800 MG/1
800 TABLET ORAL EVERY 8 HOURS PRN
Qty: 120 TABLET | Refills: 2 | Status: SHIPPED | OUTPATIENT
Start: 2024-05-30

## 2024-05-30 ASSESSMENT — PATIENT HEALTH QUESTIONNAIRE - PHQ9
SUM OF ALL RESPONSES TO PHQ QUESTIONS 1-9: 0
1. LITTLE INTEREST OR PLEASURE IN DOING THINGS: NOT AT ALL
SUM OF ALL RESPONSES TO PHQ9 QUESTIONS 1 & 2: 0
2. FEELING DOWN, DEPRESSED OR HOPELESS: NOT AT ALL

## 2024-05-30 NOTE — PROGRESS NOTES
Clayton Garcia (:  1965) is a 58 y.o. male,Established patient, here for evaluation of the following chief complaint(s):  Follow-up      Assessment & Plan   ASSESSMENT/PLAN:  Clayton was seen today for follow-up.    Diagnoses and all orders for this visit:  Diabetic neuropathy  Stable on 100-200 mg gabapentin prn.  Essential hypertension  The current medical regimen is effective;  continue present plan and medications.   Diabetes mellitus type 2, insulin dependent (HCC)  well controlled; following with endo  Shoulder pain, unspecified chronicity, unspecified laterality   Stable.  Advised to use caution with ibu. Takes sparingly.  Will try tylenol 1st  Anxiety  Has been sparing with xanax in the use. Aware just for severe breakthrough symptoms  Controlled Substances Monitoring: Periodic Controlled Substance Monitoring: Possible medication side effects, risk of tolerance/dependence & alternative treatments discussed., No signs of potential drug abuse or diversion identified. (Beau Miller MD)     Return in about 6 months (around 2024).         Subjective   SUBJECTIVE/OBJECTIVE:  HPI  Pt is a of 58 y.o. male comes in today with   Chief Complaint   Patient presents with    Follow-up     Diet and exercise have been going well.  A1c 6.9 recently.    Review of Systems     Vitals:    24 0752   BP: 130/72   Pulse: 74   SpO2: 98%   Weight: 83.9 kg (185 lb)   Height: 1.702 m (5' 7\")       Objective   Physical Exam  Constitutional:       Appearance: Normal appearance.   Cardiovascular:      Rate and Rhythm: Normal rate and regular rhythm.   Pulmonary:      Effort: Pulmonary effort is normal.      Breath sounds: Normal breath sounds.   Neurological:      Mental Status: He is alert.              An electronic signature was used to authenticate this note.    --Beau Miller MD

## 2024-08-05 DIAGNOSIS — M25.519 SHOULDER PAIN, UNSPECIFIED CHRONICITY, UNSPECIFIED LATERALITY: ICD-10-CM

## 2024-08-05 RX ORDER — IBUPROFEN 800 MG/1
800 TABLET ORAL EVERY 8 HOURS PRN
Qty: 270 TABLET | Refills: 3 | Status: SHIPPED | OUTPATIENT
Start: 2024-08-05

## 2024-08-05 NOTE — TELEPHONE ENCOUNTER
Medication:   Requested Prescriptions     Pending Prescriptions Disp Refills    ibuprofen (ADVIL;MOTRIN) 800 MG tablet [Pharmacy Med Name: Ibuprofen 800 MG Oral Tablet] 270 tablet 3     Sig: TAKE 1 TABLET BY MOUTH EVERY 8  HOURS AS NEEDED FOR PAIN       Last Filled:  5/30/24    Patient Phone Number: [phone] (home) [phone] (work)    Last appt: 5/30/2024   Next appt: 11/26/2024    Last Labs DM:   Lab Results   Component Value Date/Time    LABA1C 6.9 04/30/2024 04:43 PM     Last Lipid:   Lab Results   Component Value Date/Time    CHOL 103 04/27/2023 07:37 AM    TRIG 166 04/27/2023 07:37 AM    HDL 31 04/27/2023 07:37 AM     Last PSA:   Lab Results   Component Value Date/Time    PSA 0.22 04/27/2023 07:37 AM     Last Thyroid:   Lab Results   Component Value Date/Time    TSH 3.530 10/07/2020 07:38 AM

## 2024-09-09 ENCOUNTER — OFFICE VISIT (OUTPATIENT)
Dept: ENDOCRINOLOGY | Age: 59
End: 2024-09-09
Payer: COMMERCIAL

## 2024-09-09 VITALS
RESPIRATION RATE: 15 BRPM | HEIGHT: 67 IN | OXYGEN SATURATION: 99 % | BODY MASS INDEX: 28.41 KG/M2 | DIASTOLIC BLOOD PRESSURE: 83 MMHG | SYSTOLIC BLOOD PRESSURE: 126 MMHG | WEIGHT: 181 LBS | HEART RATE: 81 BPM

## 2024-09-09 DIAGNOSIS — Z79.4 DIABETES MELLITUS TYPE 2, INSULIN DEPENDENT (HCC): ICD-10-CM

## 2024-09-09 DIAGNOSIS — E11.9 DIABETES MELLITUS TYPE 2, INSULIN DEPENDENT (HCC): ICD-10-CM

## 2024-09-09 DIAGNOSIS — R80.9 MICROALBUMINURIA: Primary | ICD-10-CM

## 2024-09-09 LAB — HBA1C MFR BLD: 6.8 %

## 2024-09-09 PROCEDURE — 3044F HG A1C LEVEL LT 7.0%: CPT | Performed by: INTERNAL MEDICINE

## 2024-09-09 PROCEDURE — G8427 DOCREV CUR MEDS BY ELIG CLIN: HCPCS | Performed by: INTERNAL MEDICINE

## 2024-09-09 PROCEDURE — 3079F DIAST BP 80-89 MM HG: CPT | Performed by: INTERNAL MEDICINE

## 2024-09-09 PROCEDURE — 83036 HEMOGLOBIN GLYCOSYLATED A1C: CPT | Performed by: INTERNAL MEDICINE

## 2024-09-09 PROCEDURE — 1036F TOBACCO NON-USER: CPT | Performed by: INTERNAL MEDICINE

## 2024-09-09 PROCEDURE — 3074F SYST BP LT 130 MM HG: CPT | Performed by: INTERNAL MEDICINE

## 2024-09-09 PROCEDURE — 2022F DILAT RTA XM EVC RTNOPTHY: CPT | Performed by: INTERNAL MEDICINE

## 2024-09-09 PROCEDURE — 95251 CONT GLUC MNTR ANALYSIS I&R: CPT | Performed by: INTERNAL MEDICINE

## 2024-09-09 PROCEDURE — G8417 CALC BMI ABV UP PARAM F/U: HCPCS | Performed by: INTERNAL MEDICINE

## 2024-09-09 PROCEDURE — 99214 OFFICE O/P EST MOD 30 MIN: CPT | Performed by: INTERNAL MEDICINE

## 2024-09-09 PROCEDURE — 3017F COLORECTAL CA SCREEN DOC REV: CPT | Performed by: INTERNAL MEDICINE

## 2024-09-09 RX ORDER — DULAGLUTIDE 3 MG/.5ML
INJECTION, SOLUTION SUBCUTANEOUS
Qty: 13 ADJUSTABLE DOSE PRE-FILLED PEN SYRINGE | Refills: 1 | Status: SHIPPED | OUTPATIENT
Start: 2024-09-09

## 2024-09-09 RX ORDER — INSULIN GLARGINE 100 [IU]/ML
INJECTION, SOLUTION SUBCUTANEOUS
Qty: 75 ML | Refills: 1 | Status: SHIPPED | OUTPATIENT
Start: 2024-09-09

## 2024-09-09 RX ORDER — INSULIN LISPRO 100 [IU]/ML
INJECTION, SOLUTION INTRAVENOUS; SUBCUTANEOUS
Qty: 60 ML | Refills: 1 | Status: SHIPPED | OUTPATIENT
Start: 2024-09-09

## 2024-10-28 RX ORDER — FUROSEMIDE 20 MG/1
20 TABLET ORAL DAILY
Qty: 90 TABLET | Refills: 3 | Status: SHIPPED | OUTPATIENT
Start: 2024-10-28

## 2024-10-28 NOTE — TELEPHONE ENCOUNTER
Medication:   Requested Prescriptions     Pending Prescriptions Disp Refills    furosemide (LASIX) 20 MG tablet [Pharmacy Med Name: Furosemide 20 MG Oral Tablet] 90 tablet 3     Sig: TAKE 1 TABLET BY MOUTH DAILY       Last Filled:  5/30/24    Patient Phone Number: [phone] (home) [phone] (work)    Last appt: 5/30/2024   Next appt: 11/19/2024    Last Labs DM:   Lab Results   Component Value Date/Time    LABA1C 6.8 09/09/2024 04:54 PM     Last Lipid:   Lab Results   Component Value Date/Time    CHOL 103 04/27/2023 07:37 AM    TRIG 166 04/27/2023 07:37 AM    HDL 31 04/27/2023 07:37 AM     Last PSA:   Lab Results   Component Value Date/Time    PSA 0.22 04/27/2023 07:37 AM     Last Thyroid:   Lab Results   Component Value Date/Time    TSH 3.530 10/07/2020 07:38 AM

## 2024-11-15 DIAGNOSIS — Z12.5 SCREENING PSA (PROSTATE SPECIFIC ANTIGEN): ICD-10-CM

## 2024-11-15 DIAGNOSIS — E11.3311 TYPE 2 DIABETES MELLITUS WITH RIGHT EYE AFFECTED BY MODERATE NONPROLIFERATIVE RETINOPATHY AND MACULAR EDEMA, WITH LONG-TERM CURRENT USE OF INSULIN (HCC): ICD-10-CM

## 2024-11-15 DIAGNOSIS — Z79.4 TYPE 2 DIABETES MELLITUS WITH RIGHT EYE AFFECTED BY MODERATE NONPROLIFERATIVE RETINOPATHY AND MACULAR EDEMA, WITH LONG-TERM CURRENT USE OF INSULIN (HCC): ICD-10-CM

## 2024-11-15 LAB
ALBUMIN SERPL-MCNC: 4.3 G/DL (ref 3.4–5)
ALBUMIN/GLOB SERPL: 1.8 {RATIO} (ref 1.1–2.2)
ALP SERPL-CCNC: 73 U/L (ref 40–129)
ALT SERPL-CCNC: 32 U/L (ref 10–40)
ANION GAP SERPL CALCULATED.3IONS-SCNC: 12 MMOL/L (ref 3–16)
AST SERPL-CCNC: 27 U/L (ref 15–37)
BILIRUB SERPL-MCNC: 0.5 MG/DL (ref 0–1)
BUN SERPL-MCNC: 17 MG/DL (ref 7–20)
CALCIUM SERPL-MCNC: 9.7 MG/DL (ref 8.3–10.6)
CHLORIDE SERPL-SCNC: 102 MMOL/L (ref 99–110)
CHOLEST SERPL-MCNC: 107 MG/DL (ref 0–199)
CO2 SERPL-SCNC: 27 MMOL/L (ref 21–32)
CREAT SERPL-MCNC: 1.1 MG/DL (ref 0.9–1.3)
CREAT UR-MCNC: 162 MG/DL (ref 39–259)
GFR SERPLBLD CREATININE-BSD FMLA CKD-EPI: 77 ML/MIN/{1.73_M2}
GLUCOSE SERPL-MCNC: 54 MG/DL (ref 70–99)
HDLC SERPL-MCNC: 35 MG/DL (ref 40–60)
LDLC SERPL CALC-MCNC: 44 MG/DL
MICROALBUMIN UR DL<=1MG/L-MCNC: <1.2 MG/DL
MICROALBUMIN/CREAT UR: NORMAL MG/G (ref 0–30)
POTASSIUM SERPL-SCNC: 4.1 MMOL/L (ref 3.5–5.1)
PROT SERPL-MCNC: 6.7 G/DL (ref 6.4–8.2)
PSA SERPL DL<=0.01 NG/ML-MCNC: 0.28 NG/ML (ref 0–4)
SODIUM SERPL-SCNC: 141 MMOL/L (ref 136–145)
TRIGL SERPL-MCNC: 138 MG/DL (ref 0–150)
VLDLC SERPL CALC-MCNC: 28 MG/DL

## 2024-11-19 ENCOUNTER — OFFICE VISIT (OUTPATIENT)
Dept: FAMILY MEDICINE CLINIC | Age: 59
End: 2024-11-19
Payer: COMMERCIAL

## 2024-11-19 VITALS
WEIGHT: 177 LBS | SYSTOLIC BLOOD PRESSURE: 120 MMHG | HEART RATE: 70 BPM | HEIGHT: 67 IN | BODY MASS INDEX: 27.78 KG/M2 | DIASTOLIC BLOOD PRESSURE: 64 MMHG | OXYGEN SATURATION: 99 %

## 2024-11-19 DIAGNOSIS — I10 ESSENTIAL HYPERTENSION: ICD-10-CM

## 2024-11-19 DIAGNOSIS — Z12.11 COLON CANCER SCREENING: ICD-10-CM

## 2024-11-19 DIAGNOSIS — E11.9 DIABETES MELLITUS TYPE 2, INSULIN DEPENDENT (HCC): Primary | ICD-10-CM

## 2024-11-19 DIAGNOSIS — Z79.4 DIABETES MELLITUS TYPE 2, INSULIN DEPENDENT (HCC): Primary | ICD-10-CM

## 2024-11-19 PROCEDURE — G8484 FLU IMMUNIZE NO ADMIN: HCPCS | Performed by: FAMILY MEDICINE

## 2024-11-19 PROCEDURE — G8417 CALC BMI ABV UP PARAM F/U: HCPCS | Performed by: FAMILY MEDICINE

## 2024-11-19 PROCEDURE — 3044F HG A1C LEVEL LT 7.0%: CPT | Performed by: FAMILY MEDICINE

## 2024-11-19 PROCEDURE — G8427 DOCREV CUR MEDS BY ELIG CLIN: HCPCS | Performed by: FAMILY MEDICINE

## 2024-11-19 PROCEDURE — 3078F DIAST BP <80 MM HG: CPT | Performed by: FAMILY MEDICINE

## 2024-11-19 PROCEDURE — 2022F DILAT RTA XM EVC RTNOPTHY: CPT | Performed by: FAMILY MEDICINE

## 2024-11-19 PROCEDURE — 90715 TDAP VACCINE 7 YRS/> IM: CPT | Performed by: FAMILY MEDICINE

## 2024-11-19 PROCEDURE — 3074F SYST BP LT 130 MM HG: CPT | Performed by: FAMILY MEDICINE

## 2024-11-19 PROCEDURE — 99214 OFFICE O/P EST MOD 30 MIN: CPT | Performed by: FAMILY MEDICINE

## 2024-11-19 PROCEDURE — 3017F COLORECTAL CA SCREEN DOC REV: CPT | Performed by: FAMILY MEDICINE

## 2024-11-19 PROCEDURE — 90471 IMMUNIZATION ADMIN: CPT | Performed by: FAMILY MEDICINE

## 2024-11-19 PROCEDURE — 1036F TOBACCO NON-USER: CPT | Performed by: FAMILY MEDICINE

## 2024-11-19 RX ORDER — ATORVASTATIN CALCIUM 80 MG/1
80 TABLET, FILM COATED ORAL DAILY
Qty: 90 TABLET | Refills: 3 | Status: SHIPPED | OUTPATIENT
Start: 2024-11-19

## 2024-11-19 RX ORDER — METOPROLOL TARTRATE 50 MG
50 TABLET ORAL 2 TIMES DAILY
Qty: 180 TABLET | Refills: 3 | Status: SHIPPED | OUTPATIENT
Start: 2024-11-19

## 2024-11-19 RX ORDER — FUROSEMIDE 20 MG/1
20 TABLET ORAL DAILY
Qty: 90 TABLET | Refills: 3 | Status: SHIPPED | OUTPATIENT
Start: 2024-11-19

## 2024-11-19 RX ORDER — AMLODIPINE BESYLATE 10 MG/1
10 TABLET ORAL DAILY
Qty: 90 TABLET | Refills: 3 | Status: SHIPPED | OUTPATIENT
Start: 2024-11-19

## 2024-11-19 RX ORDER — HYDROCHLOROTHIAZIDE 25 MG/1
25 TABLET ORAL DAILY
Qty: 90 TABLET | Refills: 3 | Status: SHIPPED | OUTPATIENT
Start: 2024-11-19

## 2024-11-19 RX ORDER — RAMIPRIL 10 MG/1
10 CAPSULE ORAL DAILY
Qty: 90 CAPSULE | Refills: 3 | Status: SHIPPED | OUTPATIENT
Start: 2024-11-19

## 2024-11-19 RX ORDER — GLIMEPIRIDE 4 MG/1
4 TABLET ORAL
Qty: 90 TABLET | Refills: 3 | Status: SHIPPED | OUTPATIENT
Start: 2024-11-19

## 2024-11-19 NOTE — PROGRESS NOTES
Clayton Garcia (:  1965) is a 59 y.o. male,Established patient, here for evaluation of the following chief complaint(s):  No chief complaint on file.      Assessment & Plan   ASSESSMENT/PLAN:  Diagnoses and all orders for this visit:    Diabetes mellitus type 2, insulin dependent (HCC)  -     empagliflozin (JARDIANCE) 10 MG tablet; TAKE 1 TABLET BY MOUTH DAILY  -     glimepiride (AMARYL) 4 MG tablet; Take 1 tablet by mouth every morning (before breakfast)  -     metFORMIN (GLUCOPHAGE) 1000 MG tablet; Take 1 tablet by mouth Daily with supper  -     Lipid Panel; Future  -     Comprehensive Metabolic Panel; Future  The current medical regimen is effective;  continue present plan and medications.   Essential hypertension  -     amLODIPine (NORVASC) 10 MG tablet; Take 1 tablet by mouth daily  -     metoprolol tartrate (LOPRESSOR) 50 MG tablet; Take 1 tablet by mouth 2 times daily  The current medical regimen is effective;  continue present plan and medications.   Colon cancer screening  -     [facility] - Jovita Baird MD, Gastroenterology (ERCP & EUS), [place]    Other orders  -     atorvastatin (LIPITOR) 80 MG tablet; Take 1 tablet by mouth daily  -     ramipril (ALTACE) 10 MG capsule; Take 1 capsule by mouth daily  -     hydroCHLOROthiazide (HYDRODIURIL) 25 MG tablet; Take 1 tablet by mouth daily  -     furosemide (LASIX) 20 MG tablet; Take 1 tablet by mouth daily  -     Tdap, BOOSTRIX, (age 10 yrs+), IM         No follow-ups on file.         Subjective   SUBJECTIVE/OBJECTIVE:  HPI  Pt is a of 59 y.o. male comes in today with   Chief Complaint   Patient presents with    Follow-up     Vitals:    24 0759   BP: 120/64   Pulse: 70   SpO2: 99%   Weight: 80.3 kg (177 lb)   Height: 1.702 m (5' 7\")       Past Medical History:Reviewed  Medications:Reviewed.  No Known Allergies   Social hx:Reviewed.  Social History     Tobacco Use   Smoking Status Never   Smokeless Tobacco Never        Review of Systems

## 2024-12-03 ENCOUNTER — OFFICE VISIT (OUTPATIENT)
Dept: ENDOCRINOLOGY | Age: 59
End: 2024-12-03
Payer: COMMERCIAL

## 2024-12-03 VITALS — WEIGHT: 177 LBS | BODY MASS INDEX: 27.72 KG/M2 | RESPIRATION RATE: 15 BRPM

## 2024-12-03 DIAGNOSIS — Z79.4 DIABETES MELLITUS TYPE 2, INSULIN DEPENDENT (HCC): ICD-10-CM

## 2024-12-03 DIAGNOSIS — E78.2 MIXED HYPERLIPIDEMIA: Primary | ICD-10-CM

## 2024-12-03 DIAGNOSIS — E11.9 DIABETES MELLITUS TYPE 2, INSULIN DEPENDENT (HCC): ICD-10-CM

## 2024-12-03 LAB — HBA1C MFR BLD: 7.1 %

## 2024-12-03 PROCEDURE — 95251 CONT GLUC MNTR ANALYSIS I&R: CPT | Performed by: INTERNAL MEDICINE

## 2024-12-03 PROCEDURE — 99214 OFFICE O/P EST MOD 30 MIN: CPT | Performed by: INTERNAL MEDICINE

## 2024-12-03 PROCEDURE — 2022F DILAT RTA XM EVC RTNOPTHY: CPT | Performed by: INTERNAL MEDICINE

## 2024-12-03 PROCEDURE — G8484 FLU IMMUNIZE NO ADMIN: HCPCS | Performed by: INTERNAL MEDICINE

## 2024-12-03 PROCEDURE — 3044F HG A1C LEVEL LT 7.0%: CPT | Performed by: INTERNAL MEDICINE

## 2024-12-03 PROCEDURE — 3017F COLORECTAL CA SCREEN DOC REV: CPT | Performed by: INTERNAL MEDICINE

## 2024-12-03 PROCEDURE — G8417 CALC BMI ABV UP PARAM F/U: HCPCS | Performed by: INTERNAL MEDICINE

## 2024-12-03 PROCEDURE — G8427 DOCREV CUR MEDS BY ELIG CLIN: HCPCS | Performed by: INTERNAL MEDICINE

## 2024-12-03 PROCEDURE — 1036F TOBACCO NON-USER: CPT | Performed by: INTERNAL MEDICINE

## 2024-12-03 PROCEDURE — 83036 HEMOGLOBIN GLYCOSYLATED A1C: CPT | Performed by: INTERNAL MEDICINE

## 2024-12-03 RX ORDER — INSULIN GLARGINE 100 [IU]/ML
INJECTION, SOLUTION SUBCUTANEOUS
Qty: 75 ML | Refills: 1 | Status: SHIPPED | OUTPATIENT
Start: 2024-12-03

## 2024-12-03 RX ORDER — INSULIN LISPRO 100 [IU]/ML
INJECTION, SOLUTION INTRAVENOUS; SUBCUTANEOUS
Qty: 60 ML | Refills: 1 | Status: SHIPPED | OUTPATIENT
Start: 2024-12-03

## 2024-12-03 RX ORDER — ACYCLOVIR 800 MG/1
TABLET ORAL
Qty: 6 EACH | Refills: 3 | Status: SHIPPED | OUTPATIENT
Start: 2024-12-03

## 2024-12-03 NOTE — PROGRESS NOTES
Seen as  patient for diabetes    Referred by Dr. Castaneda    Interim:    Using CGM  Recent data for 2 weeks not able to be downloaded  Reviewed manually from phone    Reports feels wobbly at time  Saw neurologist and ENT   was told may be due to neuropathy  Will see balance center but not covered    Diagnosed with Type 2 diabetes mellitus in 2002    Known diabetic complications: Retinopathy, neuropathy  Uncontrolled, moderate    Current diabetic medications     Reports has had to change medication due to insurance    Lantus 50  but may do 40 mostly  Humalog SSI  20 units TID   2 for 50 > 150    Jardiance  Trulicity 3  Amaryl 4mg   Metformin 1000mg with supper    Last A1c 7.1%<----6.8%<--- 6.9%<----7.5% <-----7.7%<----7.6%<------  8%<-----7.5%<----8.1%<-----  7.9%<--- 7.7%<----8.1%<-----8.7%<--- 9.2 <--- 8.1 <--- 8.4    Prior visit with dietician: Yes   Current diet: on average, 3 meals per day   Current exercise: Exercise  Current monitoring regimen: home blood tests - 2 times daily       CGM   2 weeks  Average 156  61 % in range  7 % low  32% high    Any episodes of hypoglycemia?   Worsened by high CHO    CABG in 2016  PCI 2002    Hyperlipidemia:   For   Years  Takes lipitor 80mg  Controlled  LDL 60 on 1/20  LDL 39 on 4/23    Hypertension for years  Stable now reports highs in the past  Takes norvasc 10mg Metoprolol 50mg BID ramipril 10mg HCTZ 25mg    Plasma MN  1.19    nl 24 hour urine level    Testosterone 488 Prolactin 12.3 TSH nl    Aldosterone 15.5 renin 6      Last eye exam: 1/24  Last foot exam: 1/24    He has microalbuminuria  Last microalbumin to creatinine ratio: 1/21 on 1/20---> 4/23    He has neuropathy  He was On neurontin 300mg /300/600  Reports balance issues    FH: Dad CAD   Mom CAD    PMH  1.DM  2.HLD  3.CAD    SH: No smoking    Past Surgical History:   Procedure Laterality Date    CORONARY ARTERY BYPASS GRAFT REDO  2016     Current Outpatient Medications   Medication Sig Dispense Refill

## 2025-01-30 DIAGNOSIS — Z79.4 DIABETES MELLITUS TYPE 2, INSULIN DEPENDENT (HCC): ICD-10-CM

## 2025-01-30 DIAGNOSIS — E11.9 DIABETES MELLITUS TYPE 2, INSULIN DEPENDENT (HCC): ICD-10-CM

## 2025-01-30 RX ORDER — DULAGLUTIDE 3 MG/.5ML
INJECTION, SOLUTION SUBCUTANEOUS
Qty: 6 ML | Refills: 0 | Status: SHIPPED | OUTPATIENT
Start: 2025-01-30

## 2025-03-11 ENCOUNTER — OFFICE VISIT (OUTPATIENT)
Dept: ENDOCRINOLOGY | Age: 60
End: 2025-03-11
Payer: COMMERCIAL

## 2025-03-11 VITALS
OXYGEN SATURATION: 98 % | HEIGHT: 67 IN | WEIGHT: 176 LBS | RESPIRATION RATE: 16 BRPM | DIASTOLIC BLOOD PRESSURE: 60 MMHG | HEART RATE: 78 BPM | TEMPERATURE: 98 F | BODY MASS INDEX: 27.62 KG/M2 | SYSTOLIC BLOOD PRESSURE: 112 MMHG

## 2025-03-11 DIAGNOSIS — I10 BENIGN ESSENTIAL HYPERTENSION: ICD-10-CM

## 2025-03-11 DIAGNOSIS — E11.9 DIABETES MELLITUS TYPE 2, INSULIN DEPENDENT (HCC): Primary | ICD-10-CM

## 2025-03-11 DIAGNOSIS — Z79.4 DIABETES MELLITUS TYPE 2, INSULIN DEPENDENT (HCC): Primary | ICD-10-CM

## 2025-03-11 LAB — HBA1C MFR BLD: 7.3 %

## 2025-03-11 PROCEDURE — 95251 CONT GLUC MNTR ANALYSIS I&R: CPT | Performed by: INTERNAL MEDICINE

## 2025-03-11 PROCEDURE — 3078F DIAST BP <80 MM HG: CPT | Performed by: INTERNAL MEDICINE

## 2025-03-11 PROCEDURE — 3017F COLORECTAL CA SCREEN DOC REV: CPT | Performed by: INTERNAL MEDICINE

## 2025-03-11 PROCEDURE — 3074F SYST BP LT 130 MM HG: CPT | Performed by: INTERNAL MEDICINE

## 2025-03-11 PROCEDURE — 83036 HEMOGLOBIN GLYCOSYLATED A1C: CPT | Performed by: INTERNAL MEDICINE

## 2025-03-11 PROCEDURE — G8427 DOCREV CUR MEDS BY ELIG CLIN: HCPCS | Performed by: INTERNAL MEDICINE

## 2025-03-11 PROCEDURE — 99214 OFFICE O/P EST MOD 30 MIN: CPT | Performed by: INTERNAL MEDICINE

## 2025-03-11 PROCEDURE — 3046F HEMOGLOBIN A1C LEVEL >9.0%: CPT | Performed by: INTERNAL MEDICINE

## 2025-03-11 PROCEDURE — G8417 CALC BMI ABV UP PARAM F/U: HCPCS | Performed by: INTERNAL MEDICINE

## 2025-03-11 PROCEDURE — 1036F TOBACCO NON-USER: CPT | Performed by: INTERNAL MEDICINE

## 2025-03-11 PROCEDURE — 2022F DILAT RTA XM EVC RTNOPTHY: CPT | Performed by: INTERNAL MEDICINE

## 2025-03-11 RX ORDER — INSULIN LISPRO 100 [IU]/ML
INJECTION, SOLUTION INTRAVENOUS; SUBCUTANEOUS
Qty: 60 ML | Refills: 1 | Status: SHIPPED | OUTPATIENT
Start: 2025-03-11

## 2025-03-11 RX ORDER — ACYCLOVIR 800 MG/1
TABLET ORAL
Qty: 6 EACH | Refills: 1 | Status: SHIPPED | OUTPATIENT
Start: 2025-03-11

## 2025-03-11 RX ORDER — DULAGLUTIDE 3 MG/.5ML
INJECTION, SOLUTION SUBCUTANEOUS
Qty: 6 ML | Refills: 1 | Status: SHIPPED | OUTPATIENT
Start: 2025-03-11

## 2025-03-11 RX ORDER — INSULIN GLARGINE 100 [IU]/ML
INJECTION, SOLUTION SUBCUTANEOUS
Qty: 75 ML | Refills: 1 | Status: SHIPPED | OUTPATIENT
Start: 2025-03-11

## 2025-03-11 NOTE — PROGRESS NOTES
SOAJ INJECT THE CONTENTS OF ONE PEN  SUBCUTANEOUSLY WEEKLY AS  DIRECTED 6 mL 0    Continuous Glucose Sensor (FREESTYLE DILMA 3 SENSOR) [facility] [place] To check glucose ---please change every 14 days 6 each 3    insulin lispro, 1 Unit Dial, (HUMALOG KWIKPEN) 100 UNIT/ML SOPN INJECT SUBCUTANEOUSLY 3  TIMES DAILY PER SLIDING  SCALE PLUS 18 UNITS MEAL  CORRECTION DOSAGE. MAXIMUM  60 UNITS DAILY 60 mL 1    insulin glargine (LANTUS SOLOSTAR) 100 UNIT/ML injection pen INJECT SUBCUTANEOUSLY 52  UNITS AT NIGHT 75 mL 1    amLODIPine (NORVASC) 10 MG tablet Take 1 tablet by mouth daily 90 tablet 3    atorvastatin (LIPITOR) 80 MG tablet Take 1 tablet by mouth daily 90 tablet 3    empagliflozin (JARDIANCE) 10 MG tablet TAKE 1 TABLET BY MOUTH DAILY 90 tablet 1    glimepiride (AMARYL) 4 MG tablet Take 1 tablet by mouth every morning (before breakfast) 90 tablet 3    metFORMIN (GLUCOPHAGE) 1000 MG tablet Take 1 tablet by mouth Daily with supper 90 tablet 3    metoprolol tartrate (LOPRESSOR) 50 MG tablet Take 1 tablet by mouth 2 times daily 180 tablet 3    ramipril (ALTACE) 10 MG capsule Take 1 capsule by mouth daily 90 capsule 3    hydroCHLOROthiazide (HYDRODIURIL) 25 MG tablet Take 1 tablet by mouth daily 90 tablet 3    furosemide (LASIX) 20 MG tablet Take 1 tablet by mouth daily 90 tablet 3    Handicap Placard MISC by Does not apply route 5 years  For Diabetes, neuropathy 1 each 0    ibuprofen (ADVIL;MOTRIN) 800 MG tablet TAKE 1 TABLET BY MOUTH EVERY 8  HOURS AS NEEDED FOR PAIN 270 tablet 3    nitroGLYCERIN (NITROSTAT) 0.4 MG SL tablet Place 1 tablet under the tongue every 5 minutes as needed for Chest pain 25 tablet 0    Insulin Pen Needle (B-D UF III MINI PEN NEEDLES) 31G X 5 MM MISC Inject insulin 4 times a day. 300 each 3    blood glucose test strips (ASCENSIA AUTODISC VI;ONE TOUCH ULTRA TEST VI) strip 1 each by In Vitro route 4 times daily (before meals and nightly) As needed. 100 each 3    blood glucose monitor strips Test 2 times a

## 2025-05-20 ENCOUNTER — OFFICE VISIT (OUTPATIENT)
Dept: FAMILY MEDICINE CLINIC | Age: 60
End: 2025-05-20
Payer: COMMERCIAL

## 2025-05-20 VITALS
BODY MASS INDEX: 27.84 KG/M2 | SYSTOLIC BLOOD PRESSURE: 110 MMHG | DIASTOLIC BLOOD PRESSURE: 60 MMHG | HEIGHT: 67 IN | OXYGEN SATURATION: 99 % | HEART RATE: 74 BPM | WEIGHT: 177.4 LBS

## 2025-05-20 DIAGNOSIS — E78.2 MIXED DYSLIPIDEMIA: ICD-10-CM

## 2025-05-20 DIAGNOSIS — I10 BENIGN ESSENTIAL HYPERTENSION: ICD-10-CM

## 2025-05-20 DIAGNOSIS — E11.3311 TYPE 2 DIABETES MELLITUS WITH RIGHT EYE AFFECTED BY MODERATE NONPROLIFERATIVE RETINOPATHY AND MACULAR EDEMA, WITH LONG-TERM CURRENT USE OF INSULIN (HCC): Primary | ICD-10-CM

## 2025-05-20 DIAGNOSIS — E11.9 DIABETES MELLITUS TYPE 2, INSULIN DEPENDENT (HCC): ICD-10-CM

## 2025-05-20 DIAGNOSIS — Z79.4 DIABETES MELLITUS TYPE 2, INSULIN DEPENDENT (HCC): ICD-10-CM

## 2025-05-20 DIAGNOSIS — K21.9 GASTROESOPHAGEAL REFLUX DISEASE WITHOUT ESOPHAGITIS: ICD-10-CM

## 2025-05-20 DIAGNOSIS — Z79.4 TYPE 2 DIABETES MELLITUS WITH RIGHT EYE AFFECTED BY MODERATE NONPROLIFERATIVE RETINOPATHY AND MACULAR EDEMA, WITH LONG-TERM CURRENT USE OF INSULIN (HCC): Primary | ICD-10-CM

## 2025-05-20 PROCEDURE — 3078F DIAST BP <80 MM HG: CPT | Performed by: FAMILY MEDICINE

## 2025-05-20 PROCEDURE — 1036F TOBACCO NON-USER: CPT | Performed by: FAMILY MEDICINE

## 2025-05-20 PROCEDURE — G8417 CALC BMI ABV UP PARAM F/U: HCPCS | Performed by: FAMILY MEDICINE

## 2025-05-20 PROCEDURE — 2022F DILAT RTA XM EVC RTNOPTHY: CPT | Performed by: FAMILY MEDICINE

## 2025-05-20 PROCEDURE — 3017F COLORECTAL CA SCREEN DOC REV: CPT | Performed by: FAMILY MEDICINE

## 2025-05-20 PROCEDURE — G8427 DOCREV CUR MEDS BY ELIG CLIN: HCPCS | Performed by: FAMILY MEDICINE

## 2025-05-20 PROCEDURE — 3051F HG A1C>EQUAL 7.0%<8.0%: CPT | Performed by: FAMILY MEDICINE

## 2025-05-20 PROCEDURE — 99214 OFFICE O/P EST MOD 30 MIN: CPT | Performed by: FAMILY MEDICINE

## 2025-05-20 PROCEDURE — 3074F SYST BP LT 130 MM HG: CPT | Performed by: FAMILY MEDICINE

## 2025-05-20 RX ORDER — PANTOPRAZOLE SODIUM 40 MG/1
40 TABLET, DELAYED RELEASE ORAL
Qty: 90 TABLET | Refills: 0 | Status: SHIPPED | OUTPATIENT
Start: 2025-05-20

## 2025-05-20 SDOH — ECONOMIC STABILITY: FOOD INSECURITY: WITHIN THE PAST 12 MONTHS, YOU WORRIED THAT YOUR FOOD WOULD RUN OUT BEFORE YOU GOT MONEY TO BUY MORE.: NEVER TRUE

## 2025-05-20 SDOH — ECONOMIC STABILITY: FOOD INSECURITY: WITHIN THE PAST 12 MONTHS, THE FOOD YOU BOUGHT JUST DIDN'T LAST AND YOU DIDN'T HAVE MONEY TO GET MORE.: NEVER TRUE

## 2025-05-20 ASSESSMENT — PATIENT HEALTH QUESTIONNAIRE - PHQ9
SUM OF ALL RESPONSES TO PHQ QUESTIONS 1-9: 0
SUM OF ALL RESPONSES TO PHQ QUESTIONS 1-9: 0
1. LITTLE INTEREST OR PLEASURE IN DOING THINGS: NOT AT ALL
SUM OF ALL RESPONSES TO PHQ QUESTIONS 1-9: 0
SUM OF ALL RESPONSES TO PHQ QUESTIONS 1-9: 0
2. FEELING DOWN, DEPRESSED OR HOPELESS: NOT AT ALL

## 2025-05-20 NOTE — PROGRESS NOTES
Clayton Garcia (:  1965) is a 59 y.o. male,Established patient, here for evaluation of the following chief complaint(s):  Diabetes      Assessment & Plan   ASSESSMENT/PLAN:  Clayton \"Neymar\" was seen today for diabetes.    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   Mixed dyslipidemia  Stable on statin   Gerd  Not well controlled  Trial of ppi for a few months, then try to wean    No follow-ups on file.         Subjective   SUBJECTIVE/OBJECTIVE:  HPI  Pt is a of 59 y.o. male comes in today with   Chief Complaint   Patient presents with    Diabetes     Here for follow up.  Seeing endo for dm  Has referral for colonoscopy.    Vitals:    25 0804   BP: 110/60   BP Site: Left Upper Arm   Patient Position: Sitting   BP Cuff Size: Medium Adult   Pulse: 74   SpO2: 99%   Weight: 80.5 kg (177 lb 6.4 oz)   Height: 1.702 m (5' 7.01\")       Past Medical History:Reviewed  Medications:Reviewed.  No Known Allergies   Social hx:Reviewed.  Social History     Tobacco Use   Smoking Status Never   Smokeless Tobacco Never        Review of Systems       Objective   Physical Exam  Constitutional:       General: He is not in acute distress.     Appearance: Normal appearance. He is well-developed. He is not diaphoretic.   HENT:      Head: Normocephalic and atraumatic.      Mouth/Throat:      Mouth: Mucous membranes are moist.      Pharynx: Oropharynx is clear.   Eyes:      General: No scleral icterus.  Neck:      Thyroid: No thyromegaly.      Trachea: No tracheal deviation.   Cardiovascular:      Rate and Rhythm: Normal rate and regular rhythm.      Heart sounds: Normal heart sounds. No murmur heard.  Pulmonary:      Effort: Pulmonary

## 2025-06-16 ENCOUNTER — OFFICE VISIT (OUTPATIENT)
Dept: FAMILY MEDICINE CLINIC | Age: 60
End: 2025-06-16
Payer: COMMERCIAL

## 2025-06-16 VITALS
WEIGHT: 172 LBS | HEART RATE: 68 BPM | SYSTOLIC BLOOD PRESSURE: 122 MMHG | OXYGEN SATURATION: 96 % | HEIGHT: 67 IN | DIASTOLIC BLOOD PRESSURE: 73 MMHG | BODY MASS INDEX: 27 KG/M2

## 2025-06-16 DIAGNOSIS — J01.90 ACUTE BACTERIAL SINUSITIS: Primary | ICD-10-CM

## 2025-06-16 DIAGNOSIS — J40 BRONCHITIS: ICD-10-CM

## 2025-06-16 DIAGNOSIS — B96.89 ACUTE BACTERIAL SINUSITIS: Primary | ICD-10-CM

## 2025-06-16 DIAGNOSIS — R50.9 FEVER, UNSPECIFIED FEVER CAUSE: ICD-10-CM

## 2025-06-16 PROCEDURE — 99214 OFFICE O/P EST MOD 30 MIN: CPT | Performed by: NURSE PRACTITIONER

## 2025-06-16 PROCEDURE — 3078F DIAST BP <80 MM HG: CPT | Performed by: NURSE PRACTITIONER

## 2025-06-16 PROCEDURE — 3017F COLORECTAL CA SCREEN DOC REV: CPT | Performed by: NURSE PRACTITIONER

## 2025-06-16 PROCEDURE — 1036F TOBACCO NON-USER: CPT | Performed by: NURSE PRACTITIONER

## 2025-06-16 PROCEDURE — G8417 CALC BMI ABV UP PARAM F/U: HCPCS | Performed by: NURSE PRACTITIONER

## 2025-06-16 PROCEDURE — 3074F SYST BP LT 130 MM HG: CPT | Performed by: NURSE PRACTITIONER

## 2025-06-16 PROCEDURE — G8427 DOCREV CUR MEDS BY ELIG CLIN: HCPCS | Performed by: NURSE PRACTITIONER

## 2025-06-16 RX ORDER — ALBUTEROL SULFATE 90 UG/1
2 INHALANT RESPIRATORY (INHALATION) 4 TIMES DAILY PRN
Qty: 18 G | Refills: 0 | Status: SHIPPED | OUTPATIENT
Start: 2025-06-16

## 2025-06-16 ASSESSMENT — ENCOUNTER SYMPTOMS
EYE DISCHARGE: 0
CHOKING: 0
VOMITING: 0
RHINORRHEA: 0
EYE PAIN: 0
NAUSEA: 0
STRIDOR: 0
COLOR CHANGE: 0
EYE ITCHING: 0
SORE THROAT: 0
SHORTNESS OF BREATH: 0
COUGH: 1
SINUS PRESSURE: 1
TROUBLE SWALLOWING: 0
SINUS PAIN: 0
DIARRHEA: 0
VOICE CHANGE: 0
PHOTOPHOBIA: 0
WHEEZING: 1
ABDOMINAL PAIN: 0
CONSTIPATION: 0
BLOOD IN STOOL: 0
BACK PAIN: 0
CHEST TIGHTNESS: 1
EYE REDNESS: 0

## 2025-06-16 NOTE — PROGRESS NOTES
Chief Complaint   Patient presents with    Pharyngitis    Congestion    Cough     Onset 5 days ago       /73   Pulse 68   Ht 1.702 m (5' 7\")   Wt 78 kg (172 lb)   SpO2 96%   BMI 26.94 kg/m²     HPI:  Clayton Garcia is a 59 y.o. (: 1965) here today   for   HPI    Patient's medications, allergies, past medical, surgical, social and family histories were reviewed and updated as appropriate.    Chest congestion:  sore throat and cough started 5 days ago, cough has gotten worse since Thursday, low grade fever, tight in chest, wheezing, rattling, no night sweats, appetite ok, no hx pneumonia, he has tried tylenol cold and flu, has tried inhaler,       ROS:  Review of Systems   Constitutional:  Positive for activity change. Negative for appetite change, chills, diaphoresis, fatigue, fever and unexpected weight change.   HENT:  Positive for congestion and sinus pressure. Negative for ear discharge, ear pain, hearing loss, nosebleeds, postnasal drip, rhinorrhea, sinus pain, sneezing, sore throat, tinnitus, trouble swallowing and voice change.    Eyes:  Negative for photophobia, pain, discharge, redness and itching.   Respiratory:  Positive for cough, chest tightness and wheezing. Negative for choking, shortness of breath and stridor.    Cardiovascular:  Negative for chest pain, palpitations and leg swelling.   Gastrointestinal:  Negative for abdominal pain, blood in stool, constipation, diarrhea, nausea and vomiting.   Endocrine: Negative for cold intolerance, heat intolerance, polydipsia and polyuria.   Genitourinary:  Negative for difficulty urinating, dysuria, enuresis, flank pain, frequency, hematuria and urgency.   Musculoskeletal:  Negative for back pain, gait problem, joint swelling, neck pain and neck stiffness.   Skin:  Negative for color change, pallor, rash and wound.   Allergic/Immunologic: Negative for environmental allergies and food allergies.   Neurological:  Negative for dizziness,

## 2025-06-24 ENCOUNTER — OFFICE VISIT (OUTPATIENT)
Dept: ENDOCRINOLOGY | Age: 60
End: 2025-06-24
Payer: COMMERCIAL

## 2025-06-24 VITALS
BODY MASS INDEX: 27.25 KG/M2 | HEART RATE: 84 BPM | WEIGHT: 174 LBS | DIASTOLIC BLOOD PRESSURE: 76 MMHG | OXYGEN SATURATION: 97 % | SYSTOLIC BLOOD PRESSURE: 118 MMHG

## 2025-06-24 DIAGNOSIS — E11.3311 TYPE 2 DIABETES MELLITUS WITH RIGHT EYE AFFECTED BY MODERATE NONPROLIFERATIVE RETINOPATHY AND MACULAR EDEMA, WITH LONG-TERM CURRENT USE OF INSULIN (HCC): Primary | ICD-10-CM

## 2025-06-24 DIAGNOSIS — Z79.4 TYPE 2 DIABETES MELLITUS WITH RIGHT EYE AFFECTED BY MODERATE NONPROLIFERATIVE RETINOPATHY AND MACULAR EDEMA, WITH LONG-TERM CURRENT USE OF INSULIN (HCC): Primary | ICD-10-CM

## 2025-06-24 LAB — HBA1C MFR BLD: 7.7 %

## 2025-06-24 PROCEDURE — 3078F DIAST BP <80 MM HG: CPT | Performed by: INTERNAL MEDICINE

## 2025-06-24 PROCEDURE — 2022F DILAT RTA XM EVC RTNOPTHY: CPT | Performed by: INTERNAL MEDICINE

## 2025-06-24 PROCEDURE — 83036 HEMOGLOBIN GLYCOSYLATED A1C: CPT | Performed by: INTERNAL MEDICINE

## 2025-06-24 PROCEDURE — 95251 CONT GLUC MNTR ANALYSIS I&R: CPT | Performed by: INTERNAL MEDICINE

## 2025-06-24 PROCEDURE — 3051F HG A1C>EQUAL 7.0%<8.0%: CPT | Performed by: INTERNAL MEDICINE

## 2025-06-24 PROCEDURE — 3017F COLORECTAL CA SCREEN DOC REV: CPT | Performed by: INTERNAL MEDICINE

## 2025-06-24 PROCEDURE — 1036F TOBACCO NON-USER: CPT | Performed by: INTERNAL MEDICINE

## 2025-06-24 PROCEDURE — 99214 OFFICE O/P EST MOD 30 MIN: CPT | Performed by: INTERNAL MEDICINE

## 2025-06-24 PROCEDURE — 3074F SYST BP LT 130 MM HG: CPT | Performed by: INTERNAL MEDICINE

## 2025-06-24 PROCEDURE — G8417 CALC BMI ABV UP PARAM F/U: HCPCS | Performed by: INTERNAL MEDICINE

## 2025-06-24 PROCEDURE — G8427 DOCREV CUR MEDS BY ELIG CLIN: HCPCS | Performed by: INTERNAL MEDICINE

## 2025-06-24 RX ORDER — HYDROCHLOROTHIAZIDE 12.5 MG/1
CAPSULE ORAL
Qty: 6 EACH | Refills: 3 | Status: SHIPPED | OUTPATIENT
Start: 2025-06-24

## 2025-06-24 RX ORDER — HYDROCHLOROTHIAZIDE 12.5 MG/1
CAPSULE ORAL
COMMUNITY
End: 2025-06-24 | Stop reason: SDUPTHER

## 2025-06-24 NOTE — PROGRESS NOTES
Seen as  patient for diabetes    Interim:    Using CGM  Misses lunch dose at times    Reports feels wobbly at time  Saw neurologist and ENT   was told may be due to neuropathy  Will see balance center but not covered    Diagnosed with Type 2 diabetes mellitus in 2002    Known diabetic complications: Retinopathy, neuropathy  Uncontrolled, moderate    Current diabetic medications     Reports has had to change medication due to insurance    Lantus 40  Humalog SSI  22 units TID   2 for 50 > 150    Jardiance  Trulicity 3  Amaryl 4mg   Metformin 1000mg with supper    Last A1c 7.7%<-----7.3%<----7.1%<----6.8%<--- 6.9%<----7.5% <-----7.7%<----7.6%<------  8%<-----7.5%<----8.1%<-----  7.9%<--- 7.7%<----8.1%<-----8.7%<--- 9.2 <--- 8.1 <--- 8.4    Prior visit with dietician: Yes   Current diet: on average, 3 meals per day   Current exercise: Exercise  Current monitoring regimen: home blood tests - 2 times daily       CGM   2 weeks  Average 169  58 % in range  2 % low  40% high    Post meal spikes    Any episodes of hypoglycemia?   Worsened by high CHO    CABG in 2016  PCI 2002    Hyperlipidemia:   For   Years  Takes lipitor 80mg  Controlled  LDL 60 on 1/20  LDL 39 on 4/23    Hypertension for years  Stable now reports highs in the past  Takes norvasc 10mg Metoprolol 50mg BID ramipril 10mg HCTZ 25mg    Plasma MN  1.19    nl 24 hour urine level    Testosterone 488 Prolactin 12.3 TSH nl    Aldosterone 15.5 renin 6      Last eye exam: 3/25  Last foot exam: 6.25    He has microalbuminuria  Last microalbumin to creatinine ratio: 1/21 on 1/20---> 11/24    He has neuropathy  He was On neurontin 300mg /300/600  Reports balance issues    FH: Dad CAD   Mom CAD    PMH  1.DM  2.HLD  3.CAD    SH: No smoking    Past Surgical History:   Procedure Laterality Date    CORONARY ARTERY BYPASS GRAFT REDO  2016     Current Outpatient Medications   Medication Sig Dispense Refill    albuterol sulfate HFA (VENTOLIN HFA) 108 (90 Base) MCG/ACT

## 2025-07-28 RX ORDER — PANTOPRAZOLE SODIUM 40 MG/1
40 TABLET, DELAYED RELEASE ORAL
Qty: 90 TABLET | Refills: 3 | Status: SHIPPED | OUTPATIENT
Start: 2025-07-28

## 2025-07-28 NOTE — TELEPHONE ENCOUNTER
Medication:   Requested Prescriptions     Pending Prescriptions Disp Refills    pantoprazole (PROTONIX) 40 MG tablet [Pharmacy Med Name: Pantoprazole Sodium 40 MG Oral Tablet Delayed Release] 90 tablet 3     Sig: TAKE 1 TABLET BY MOUTH EVERY  MORNING BEFORE BREAKFAST        Last Filled:      Patient Phone Number: [phone] (home) [phone] (work)    Last appt: 6/16/2025   Next appt: 11/20/2025    Last OARRS:       5/30/2024     8:21 AM   RX Monitoring   Periodic Controlled Substance Monitoring Possible medication side effects, risk of tolerance/dependence & alternative treatments discussed.;No signs of potential drug abuse or diversion identified.